# Patient Record
Sex: FEMALE | Race: OTHER | HISPANIC OR LATINO | URBAN - METROPOLITAN AREA
[De-identification: names, ages, dates, MRNs, and addresses within clinical notes are randomized per-mention and may not be internally consistent; named-entity substitution may affect disease eponyms.]

---

## 2019-05-15 ENCOUNTER — INPATIENT (INPATIENT)
Facility: HOSPITAL | Age: 39
LOS: 2 days | Discharge: HOME | DRG: 639 | End: 2019-05-18
Attending: INTERNAL MEDICINE | Admitting: HOSPITALIST
Payer: MEDICAID

## 2019-05-15 VITALS
TEMPERATURE: 98 F | RESPIRATION RATE: 16 BRPM | WEIGHT: 119.93 LBS | DIASTOLIC BLOOD PRESSURE: 85 MMHG | HEART RATE: 83 BPM | OXYGEN SATURATION: 99 % | HEIGHT: 62 IN | SYSTOLIC BLOOD PRESSURE: 144 MMHG

## 2019-05-15 DIAGNOSIS — Z98.89 OTHER SPECIFIED POSTPROCEDURAL STATES: Chronic | ICD-10-CM

## 2019-05-15 LAB
ALBUMIN SERPL ELPH-MCNC: 4.1 G/DL — SIGNIFICANT CHANGE UP (ref 3.3–5.2)
ALP SERPL-CCNC: 99 U/L — SIGNIFICANT CHANGE UP (ref 40–120)
ALT FLD-CCNC: 40 U/L — HIGH
ANION GAP SERPL CALC-SCNC: 14 MMOL/L — SIGNIFICANT CHANGE UP (ref 5–17)
ANISOCYTOSIS BLD QL: SLIGHT — SIGNIFICANT CHANGE UP
AST SERPL-CCNC: 45 U/L — HIGH
BASOPHILS # BLD AUTO: 0 K/UL — SIGNIFICANT CHANGE UP (ref 0–0.2)
BASOPHILS NFR BLD AUTO: 1 % — SIGNIFICANT CHANGE UP (ref 0–2)
BILIRUB SERPL-MCNC: 0.5 MG/DL — SIGNIFICANT CHANGE UP (ref 0.4–2)
BUN SERPL-MCNC: 17 MG/DL — SIGNIFICANT CHANGE UP (ref 8–20)
CALCIUM SERPL-MCNC: 9 MG/DL — SIGNIFICANT CHANGE UP (ref 8.6–10.2)
CHLORIDE SERPL-SCNC: 94 MMOL/L — LOW (ref 98–107)
CO2 SERPL-SCNC: 27 MMOL/L — SIGNIFICANT CHANGE UP (ref 22–29)
CREAT SERPL-MCNC: 0.54 MG/DL — SIGNIFICANT CHANGE UP (ref 0.5–1.3)
ELLIPTOCYTES BLD QL SMEAR: SLIGHT — SIGNIFICANT CHANGE UP
EOSINOPHIL # BLD AUTO: 0 K/UL — SIGNIFICANT CHANGE UP (ref 0–0.5)
GLUCOSE BLDC GLUCOMTR-MCNC: 361 MG/DL — HIGH (ref 70–99)
GLUCOSE BLDC GLUCOMTR-MCNC: 368 MG/DL — HIGH (ref 70–99)
GLUCOSE SERPL-MCNC: 82 MG/DL — SIGNIFICANT CHANGE UP (ref 70–115)
HBA1C BLD-MCNC: 9.1 % — HIGH (ref 4–5.6)
HCT VFR BLD CALC: 33.7 % — LOW (ref 37–47)
HGB BLD-MCNC: 9.3 G/DL — LOW (ref 12–16)
HYPOCHROMIA BLD QL: SLIGHT — SIGNIFICANT CHANGE UP
LYMPHOCYTES # BLD AUTO: 1.7 K/UL — SIGNIFICANT CHANGE UP (ref 1–4.8)
LYMPHOCYTES # BLD AUTO: 33 % — SIGNIFICANT CHANGE UP (ref 20–55)
MACROCYTES BLD QL: SLIGHT — SIGNIFICANT CHANGE UP
MCHC RBC-ENTMCNC: 19.1 PG — LOW (ref 27–31)
MCHC RBC-ENTMCNC: 27.6 G/DL — LOW (ref 32–36)
MCV RBC AUTO: 69.3 FL — LOW (ref 81–99)
MICROCYTES BLD QL: SLIGHT — SIGNIFICANT CHANGE UP
MONOCYTES # BLD AUTO: 0.7 K/UL — SIGNIFICANT CHANGE UP (ref 0–0.8)
MONOCYTES NFR BLD AUTO: 11 % — HIGH (ref 3–10)
NEUTROPHILS # BLD AUTO: 2.7 K/UL — SIGNIFICANT CHANGE UP (ref 1.8–8)
NEUTROPHILS NFR BLD AUTO: 54 % — SIGNIFICANT CHANGE UP (ref 37–73)
OVALOCYTES BLD QL SMEAR: SLIGHT — SIGNIFICANT CHANGE UP
PLAT MORPH BLD: NORMAL — SIGNIFICANT CHANGE UP
PLATELET # BLD AUTO: 366 K/UL — SIGNIFICANT CHANGE UP (ref 150–400)
POIKILOCYTOSIS BLD QL AUTO: SLIGHT — SIGNIFICANT CHANGE UP
POTASSIUM SERPL-MCNC: 4.1 MMOL/L — SIGNIFICANT CHANGE UP (ref 3.5–5.3)
POTASSIUM SERPL-SCNC: 4.1 MMOL/L — SIGNIFICANT CHANGE UP (ref 3.5–5.3)
PROT SERPL-MCNC: 6.7 G/DL — SIGNIFICANT CHANGE UP (ref 6.6–8.7)
RBC # BLD: 4.86 M/UL — SIGNIFICANT CHANGE UP (ref 4.4–5.2)
RBC # FLD: 18.1 % — HIGH (ref 11–15.6)
RBC BLD AUTO: ABNORMAL
SODIUM SERPL-SCNC: 135 MMOL/L — SIGNIFICANT CHANGE UP (ref 135–145)
VARIANT LYMPHS # BLD: 1 % — SIGNIFICANT CHANGE UP (ref 0–6)
WBC # BLD: 5.3 K/UL — SIGNIFICANT CHANGE UP (ref 4.8–10.8)
WBC # FLD AUTO: 5.3 K/UL — SIGNIFICANT CHANGE UP (ref 4.8–10.8)

## 2019-05-15 PROCEDURE — 99218: CPT

## 2019-05-15 RX ORDER — DEXTROSE 50 % IN WATER 50 %
25 SYRINGE (ML) INTRAVENOUS ONCE
Refills: 0 | Status: DISCONTINUED | OUTPATIENT
Start: 2019-05-15 | End: 2019-05-18

## 2019-05-15 RX ORDER — DEXTROSE 50 % IN WATER 50 %
12.5 SYRINGE (ML) INTRAVENOUS ONCE
Refills: 0 | Status: DISCONTINUED | OUTPATIENT
Start: 2019-05-15 | End: 2019-05-18

## 2019-05-15 RX ORDER — SUCRALFATE 1 G
10 TABLET ORAL
Qty: 300 | Refills: 0
Start: 2019-05-15 | End: 2019-05-24

## 2019-05-15 RX ORDER — DEXTROSE 50 % IN WATER 50 %
15 SYRINGE (ML) INTRAVENOUS ONCE
Refills: 0 | Status: COMPLETED | OUTPATIENT
Start: 2019-05-15 | End: 2019-05-15

## 2019-05-15 RX ORDER — INSULIN LISPRO 100/ML
VIAL (ML) SUBCUTANEOUS
Refills: 0 | Status: DISCONTINUED | OUTPATIENT
Start: 2019-05-15 | End: 2019-05-18

## 2019-05-15 RX ORDER — SODIUM CHLORIDE 9 MG/ML
1000 INJECTION, SOLUTION INTRAVENOUS
Refills: 0 | Status: DISCONTINUED | OUTPATIENT
Start: 2019-05-15 | End: 2019-05-18

## 2019-05-15 RX ORDER — SODIUM CHLORIDE 9 MG/ML
1000 INJECTION INTRAMUSCULAR; INTRAVENOUS; SUBCUTANEOUS ONCE
Refills: 0 | Status: COMPLETED | OUTPATIENT
Start: 2019-05-15 | End: 2019-05-15

## 2019-05-15 RX ORDER — INSULIN GLARGINE 100 [IU]/ML
16 INJECTION, SOLUTION SUBCUTANEOUS ONCE
Refills: 0 | Status: COMPLETED | OUTPATIENT
Start: 2019-05-15 | End: 2019-05-15

## 2019-05-15 RX ORDER — ACETAMINOPHEN 500 MG
650 TABLET ORAL EVERY 6 HOURS
Refills: 0 | Status: DISCONTINUED | OUTPATIENT
Start: 2019-05-15 | End: 2019-05-18

## 2019-05-15 RX ORDER — DEXTROSE 50 % IN WATER 50 %
15 SYRINGE (ML) INTRAVENOUS ONCE
Refills: 0 | Status: DISCONTINUED | OUTPATIENT
Start: 2019-05-15 | End: 2019-05-18

## 2019-05-15 RX ORDER — GLUCAGON INJECTION, SOLUTION 0.5 MG/.1ML
1 INJECTION, SOLUTION SUBCUTANEOUS ONCE
Refills: 0 | Status: DISCONTINUED | OUTPATIENT
Start: 2019-05-15 | End: 2019-05-18

## 2019-05-15 RX ORDER — INSULIN LISPRO 100/ML
VIAL (ML) SUBCUTANEOUS AT BEDTIME
Refills: 0 | Status: DISCONTINUED | OUTPATIENT
Start: 2019-05-15 | End: 2019-05-18

## 2019-05-15 RX ADMIN — Medication 650 MILLIGRAM(S): at 15:58

## 2019-05-15 RX ADMIN — Medication 650 MILLIGRAM(S): at 15:05

## 2019-05-15 RX ADMIN — Medication 650 MILLIGRAM(S): at 21:37

## 2019-05-15 RX ADMIN — SODIUM CHLORIDE 1000 MILLILITER(S): 9 INJECTION INTRAMUSCULAR; INTRAVENOUS; SUBCUTANEOUS at 22:23

## 2019-05-15 RX ADMIN — Medication 650 MILLIGRAM(S): at 20:40

## 2019-05-15 RX ADMIN — SODIUM CHLORIDE 1000 MILLILITER(S): 9 INJECTION INTRAMUSCULAR; INTRAVENOUS; SUBCUTANEOUS at 21:37

## 2019-05-15 RX ADMIN — INSULIN GLARGINE 16 UNIT(S): 100 INJECTION, SOLUTION SUBCUTANEOUS at 22:18

## 2019-05-15 RX ADMIN — Medication 15 GRAM(S): at 15:17

## 2019-05-15 RX ADMIN — Medication 3: at 21:37

## 2019-05-15 NOTE — ED STATDOCS - SECONDARY DIAGNOSIS.
Poorly controlled diabetes mellitus Gastroesophageal reflux disease, esophagitis presence not specified

## 2019-05-15 NOTE — ED STATDOCS - PROGRESS NOTE DETAILS
Per [facility], patient last filled Famotidine 40mg in December 2018.  Patient's history of taking medicines consistently is not consistent with Rx. Finger Stick shows blood sugar of 31.  reports that most recent prescription were filled at [facility] in [place].  Called to confirm medications: filled rx for famotidine 40mg daily on 4/26.  Also on atorvastatin 10qd, metformin 500 BID,. basiglar 40unit daily and solistar 50unit BID.

## 2019-05-15 NOTE — ED ADULT NURSE NOTE - OBJECTIVE STATEMENT
Assumed pt care at 1402.  Pt here for nausea and burning stomach. Pt report last took insulin yesterday at 1530.  Her finger stick was 45 this morning, she ate before coming to ER.  Finger stick 31 upon arrival  Pt given juice and sandwich.

## 2019-05-15 NOTE — ED CDU PROVIDER INITIAL DAY NOTE - MEDICAL DECISION MAKING DETAILS
Pt in CDU for hypoglycemia, management of DMI. Seen by Dr. Kylie Rosas. Pt to be observed overnight and see Diabetic specialist in AM.

## 2019-05-15 NOTE — ED STATDOCS - OBJECTIVE STATEMENT
39 y/o F pt with hx of DM I ( controlled with solastar 50 units BID) presents to ED c/o acid reflux x 2 months. Reports that she has had a bitter taste in her mouth for the last 2 months.  Presented to [facility] 2 months with same complaint; was discharged without medications. Reports that she has been prescribed a medication by her PMD for reflux with no relief; does not remember the name of medication. No diarrhea or vomiting. No ABD pain. Is a non smoker. No further complaints at this time.   PMD: Carmelita  : Tomas. 39 y/o F pt with hx of DM I ( controlled with solastar 50 units BID) presents to ED c/o "I have a lot of acid".  Reports spitting a lot and bitter taste in mouth for months.  Presented to [facility] 2 months ago with same complaint; was discharged without medications. Reports that she has been prescribed a medication by her PMD for reflux with no relief; does not remember the name of medication. No diarrhea or vomiting. No ABD pain. Is a non smoker. No further complaints at this time.   PMD: Carmelita  : Tomas.

## 2019-05-15 NOTE — ED CDU PROVIDER INITIAL DAY NOTE - OBJECTIVE STATEMENT
39 y/o F pt with hx of DM I (controlled with solastar 50 units BID) presents to ED c/o acid reflux x 2 months. Reports that she has had a bitter taste in her mouth for the last 2 months.  Presented to [facility] 2 months with same complaint; was discharged without medications. Reports that she has been prescribed a medication by her PMD for reflux with no relief; does not remember the name of medication. No diarrhea or vomiting. No ABD pain. Is a non smoker. No further complaints at this time. Pt on labs found to be hypoglycemic 31 BG.   PMD: Carmelita  : Chip

## 2019-05-15 NOTE — CONSULT NOTE ADULT - ASSESSMENT
38F North Korean speaking w/ T1DM (dx 2010) p/w worsening acid reflux, found to have hypoglycemia.    Hypoglycemia- severe and likely long standing  -due to taking too much insulin overall  -might have been increasing insulin due to not rotating injection sites  -will need CDE to reteach rotation of injection sites  -explained at length to the patient severe risks of hypoglycemia including seizures and death    -labs still pending    T1DM  -on too much insulin  -will need follow up as an outpatient with Dr. Stern as patient lives close to [place]   -patient to check FS 4x daily  -MUST get basal insulin otherwise DKA can result  -added lantus 16 units (weight based dosing)  -continue low dose sliding scale for now  -eventually will need premeal insulin but will hold off until hypoglycemia resolves

## 2019-05-15 NOTE — ED CDU PROVIDER INITIAL DAY NOTE - ATTENDING CONTRIBUTION TO CARE
presented for evalaution of acid reflux like symptoms (bitter taste in mouth), found to have low blood sugar.  On large doses of insulin than would be expected for body habitus/ wieght.  Placed in observation for trending fingerstick and endocrinology consultation

## 2019-05-15 NOTE — ED STATDOCS - CARE PLAN
Principal Discharge DX:	Hypoglycemia  Secondary Diagnosis:	Poorly controlled diabetes mellitus  Secondary Diagnosis:	Gastroesophageal reflux disease, esophagitis presence not specified

## 2019-05-16 DIAGNOSIS — R73.09 OTHER ABNORMAL GLUCOSE: ICD-10-CM

## 2019-05-16 LAB
GLUCOSE BLDC GLUCOMTR-MCNC: 199 MG/DL — HIGH (ref 70–99)
GLUCOSE BLDC GLUCOMTR-MCNC: 206 MG/DL — HIGH (ref 70–99)
GLUCOSE BLDC GLUCOMTR-MCNC: 291 MG/DL — HIGH (ref 70–99)
GLUCOSE BLDC GLUCOMTR-MCNC: 428 MG/DL — HIGH (ref 70–99)
GLUCOSE BLDC GLUCOMTR-MCNC: 434 MG/DL — HIGH (ref 70–99)
GLUCOSE BLDC GLUCOMTR-MCNC: 47 MG/DL — LOW (ref 70–99)
GLUCOSE BLDC GLUCOMTR-MCNC: 49 MG/DL — LOW (ref 70–99)
GLUCOSE BLDC GLUCOMTR-MCNC: 76 MG/DL — SIGNIFICANT CHANGE UP (ref 70–99)

## 2019-05-16 PROCEDURE — 99223 1ST HOSP IP/OBS HIGH 75: CPT

## 2019-05-16 PROCEDURE — 99217: CPT

## 2019-05-16 PROCEDURE — 99232 SBSQ HOSP IP/OBS MODERATE 35: CPT

## 2019-05-16 RX ORDER — HUMAN INSULIN 100 [IU]/ML
14 INJECTION, SUSPENSION SUBCUTANEOUS
Refills: 0 | Status: DISCONTINUED | OUTPATIENT
Start: 2019-05-16 | End: 2019-05-16

## 2019-05-16 RX ORDER — FAMOTIDINE 10 MG/ML
20 INJECTION INTRAVENOUS
Refills: 0 | Status: DISCONTINUED | OUTPATIENT
Start: 2019-05-16 | End: 2019-05-17

## 2019-05-16 RX ORDER — SODIUM CHLORIDE 9 MG/ML
1000 INJECTION INTRAMUSCULAR; INTRAVENOUS; SUBCUTANEOUS
Refills: 0 | Status: DISCONTINUED | OUTPATIENT
Start: 2019-05-16 | End: 2019-05-18

## 2019-05-16 RX ORDER — INSULIN LISPRO 100/ML
5 VIAL (ML) SUBCUTANEOUS
Refills: 0 | Status: DISCONTINUED | OUTPATIENT
Start: 2019-05-16 | End: 2019-05-17

## 2019-05-16 RX ORDER — HUMAN INSULIN 100 [IU]/ML
0.5 INJECTION, SUSPENSION SUBCUTANEOUS
Refills: 0 | Status: DISCONTINUED | OUTPATIENT
Start: 2019-05-16 | End: 2019-05-16

## 2019-05-16 RX ORDER — DEXTROSE 50 % IN WATER 50 %
15 SYRINGE (ML) INTRAVENOUS ONCE
Refills: 0 | Status: COMPLETED | OUTPATIENT
Start: 2019-05-16 | End: 2019-05-16

## 2019-05-16 RX ORDER — INSULIN GLARGINE 100 [IU]/ML
15 INJECTION, SOLUTION SUBCUTANEOUS AT BEDTIME
Refills: 0 | Status: DISCONTINUED | OUTPATIENT
Start: 2019-05-16 | End: 2019-05-17

## 2019-05-16 RX ORDER — HUMAN INSULIN 100 [IU]/ML
14 INJECTION, SUSPENSION SUBCUTANEOUS AT BEDTIME
Refills: 0 | Status: DISCONTINUED | OUTPATIENT
Start: 2019-05-16 | End: 2019-05-16

## 2019-05-16 RX ORDER — SODIUM CHLORIDE 9 MG/ML
1000 INJECTION INTRAMUSCULAR; INTRAVENOUS; SUBCUTANEOUS ONCE
Refills: 0 | Status: COMPLETED | OUTPATIENT
Start: 2019-05-16 | End: 2019-05-16

## 2019-05-16 RX ORDER — HUMAN INSULIN 100 [IU]/ML
0.5 INJECTION, SUSPENSION SUBCUTANEOUS AT BEDTIME
Refills: 0 | Status: DISCONTINUED | OUTPATIENT
Start: 2019-05-16 | End: 2019-05-16

## 2019-05-16 RX ADMIN — FAMOTIDINE 20 MILLIGRAM(S): 10 INJECTION INTRAVENOUS at 17:58

## 2019-05-16 RX ADMIN — SODIUM CHLORIDE 120 MILLILITER(S): 9 INJECTION INTRAMUSCULAR; INTRAVENOUS; SUBCUTANEOUS at 18:46

## 2019-05-16 RX ADMIN — Medication 650 MILLIGRAM(S): at 10:17

## 2019-05-16 RX ADMIN — Medication 5 UNIT(S): at 18:26

## 2019-05-16 RX ADMIN — Medication 6: at 18:26

## 2019-05-16 RX ADMIN — SODIUM CHLORIDE 1000 MILLILITER(S): 9 INJECTION INTRAMUSCULAR; INTRAVENOUS; SUBCUTANEOUS at 02:26

## 2019-05-16 RX ADMIN — INSULIN GLARGINE 15 UNIT(S): 100 INJECTION, SOLUTION SUBCUTANEOUS at 21:09

## 2019-05-16 RX ADMIN — Medication 15 GRAM(S): at 08:21

## 2019-05-16 RX ADMIN — Medication 650 MILLIGRAM(S): at 19:00

## 2019-05-16 RX ADMIN — Medication 3: at 11:46

## 2019-05-16 RX ADMIN — Medication 650 MILLIGRAM(S): at 17:58

## 2019-05-16 RX ADMIN — Medication 650 MILLIGRAM(S): at 11:40

## 2019-05-16 NOTE — ED ADULT NURSE REASSESSMENT NOTE - COMFORT CARE
warm blanket provided/darkened lights/po fluids offered/plan of care explained
ambulated to bathroom
wait time explained/plan of care explained

## 2019-05-16 NOTE — ED CDU PROVIDER SUBSEQUENT DAY NOTE - PROGRESS NOTE DETAILS
Pt received from MOY Ramos, upon morning finger stick pt found to be hypoglycemia by RN, hypoglycemia protocol initiated. D/w Dr. Ribera, will admit to medicine for labile blood glucose levels and further management.

## 2019-05-16 NOTE — ED CDU PROVIDER SUBSEQUENT DAY NOTE - ATTENDING CONTRIBUTION TO CARE
seen by endocrinology yesterday.  however, outpatient treatment plan not specified.  Widely fluctuating blood sugars noted.  awaiting repeat consultation by endocrine today with nutrition/ dietetic consult.

## 2019-05-16 NOTE — H&P ADULT - ASSESSMENT
38F Greenlandic speaking w/ T1DM (dx 2010) p/w worsening acid reflux, found to have hypoglycemia.    IDDM_ Hypoglycemia- / with hyperglycemia- discussed with Dr Trevino is here to see patient will - start patient on insulin regimen   -explained at length to the patient severe risks of hypoglycemia including seizures and death  -continue low dose sliding scale for now  -eventually will need pre meal insulin but will hold off until hypoglycemia resolves

## 2019-05-16 NOTE — ED CDU PROVIDER DISPOSITION NOTE - CLINICAL COURSE
37 y/o F with DM type I, presented with epigastric pain, found to have hypoglycemia, now with labile blood glucose levels, will admit to medicine for further management.

## 2019-05-16 NOTE — CHART NOTE - NSCHARTNOTEFT_GEN_A_CORE
Pt provided with verbal and written education on healthy eating using the my plate guidelines. Information provided in Azeri. Pt appears satisfied with interaction. RD will remain available.    Kamala Kenyon, Dietetic Intern

## 2019-05-16 NOTE — ED ADULT NURSE REASSESSMENT NOTE - GENERAL PATIENT STATE
comfortable appearance/cooperative
comfortable appearance
cooperative/resting/sleeping/comfortable appearance/smiling/interactive

## 2019-05-16 NOTE — ED CLERICAL - CLERICAL COMMENTS
Endochronology called for consult Endochronology called for consult . [facility] called for diabetic consult

## 2019-05-16 NOTE — PROGRESS NOTE ADULT - ASSESSMENT
38 year old female with type 1 DM admitted with symptoms of GERD and found to have severe Hypoglycemia, likely from use of very large doses of rapid acting insulin (admelog).  I suspect she may have irregular insulin absorption from injection into areas of lipohypertrophy.  For a Type 1 diabetic, thes insulin doses are excessively large.     1.  Hypoglycemia-  resolving.  Likely from excessive insulin dosing.  Will check AM cortisol to rule out rare chance of adrenal insufficiency as well as fasting C-peptide just to confirm if patient is actually type 1 (would expect low or undetectable C-peptide).  2.  Type 1DM-  spot to patient about proper site rotation of insulin injections to avoid irregular absorption.  She should try using abdomen and legs as new injection sites.  Will reduce Lantus to 15 units qhs and add 5 units premeal humalog as she is now hyperglycemic after eating.  Will further titrate regimen based on fingersticks.    3.  GERD-  continue famotidine.

## 2019-05-16 NOTE — H&P ADULT - NSHPPHYSICALEXAM_GEN_ALL_CORE
GENERAL: NAD, well-groomed, well-developed  HEAD:  Atraumatic, Normocephalic  ENMT: No tonsillar erythema, exudates, or enlargement; Moist mucous membranes,   NERVOUS SYSTEM:  Alert & Oriented X3, Good concentration; Motor Strength 5/5 B/L upper and lower extremities;   CHEST/LUNG: Clear to percussion bilaterally; No rales, rhonchi, wheezing, or rubs  HEART: Regular rate and rhythm; No murmurs, rubs, or gallops  ABDOMEN: Soft, Nontender, Nondistended; Bowel sounds present  EXTREMITIES:  2+ Peripheral Pulses, No clubbing, cyanosis, or edema

## 2019-05-16 NOTE — ED CDU PROVIDER DISPOSITION NOTE - ATTENDING CONTRIBUTION TO CARE
admitted to observation for therapeutic intensity/ management of diabetes.  had widely fluctuating blood sugars overnight.  case d/w endocrinology this morning.  will admit to hospitalist service for continued blood glucose tracking and treatement.

## 2019-05-16 NOTE — ED ADULT NURSE REASSESSMENT NOTE - NS ED NURSE REASSESS COMMENT FT1
Care endorsed to [employer] SUKUMAR Kahn. Patient awaiting DM consult in am.
Pt alert and oriented, no apparent distress noted at this time. Pt handed off to milagro PATINO in stable condition.
after tolerating two cups of juice and a turkey sandwich over an hour ago, repeat blood glucose 64. Pt remains comfortable, alert and oriented X 3 with no obvious signs of hypoglycemia.  Given oral glucose as per MD order.
assumed care of pt @ 1930, report received from consuelo RN, charting as noted. Pt AOx4 in NAD, Vital Signs Stable. pt awaiting DM education. HR is NSR on cardiac monitor, lung sounds are clear b/l, abd is soft and nontender with positive bowel sounds in all four quadrants, skin is warm, dry and appropriate for age and race. pt educated on plan of care and observation stay. Plan of care taught back to RN. Proficiency determined from successful pt teach back. Pt oriented to unit, staff, and room. Pt reeducated on call bell use. Bed locked in lowest position, call bell within reach. All questions and concerns addressed.
IVF bolus administered. Bedtime fs 368, 3U Humalog administered. Per cierra WINTER for pt to receive PM Lantus. All questions and concerns addressed.
Assumed care of the patient at 1530. Patient moved to CDU 8, observation unit. Patient A&Ox3. Macedonian speaking only  services provided. No s/s of distress. Patient states mild epigastric pain persists since this am however now it improved. Patient DM [id number], recently changes in her insulin regimen. Patient takes 50Units of "Admelog" in afternoon since 1 month. States Checks BG three times a day. States readings varies, in past few days frequent low sugars 45-50. Yesterday before going to sleep FS 92, patient didn't take her insulin, this morning FS was 45. Patient states had breakfast but epigastric pain persisted and patient decided to come to ED. When asked patient states have been holding insulin for BG<100. Patient provided with Meal tray. FS q4hrs. Patient demonstrated correct use of glucometer with supervision of RN. Ambulatory. Patient awaiting DM consult. Patient in understanding of plan of care. Will continue to monitor.
Patient aware of bed assignment, vital signs stable, pt in stable condition. No obvious distress noted. Report given to 3 Roxanne RN. Pt awaiting transport.

## 2019-05-16 NOTE — PROGRESS NOTE ADULT - SUBJECTIVE AND OBJECTIVE BOX
Patient seen for follow up of hypoglycemia.    Patient interviewed with use of IPAD .    Interval HPI/ Overnight Events:  Patient confirmed that she has been taking short acting insulin Admelog 50 units with breakfast and lunch only (does not take any with dinner) and basaglar insulin 40 units qhs.    On arrival to ED, her BG was in the 30s, which was presumably induced by her large bolus of Admelog 50 units.  Patient confirms that she has only been injecting these doses of insulin into her arms.      Last night she received a dose of Lantus 16 units and then 3 units of humalog for BG of > 300, and her fasting BG was low this AM.      She currently complains of headache, but denies nausea and has been eating well.      MEDICATIONS  (STANDING):  dextrose 5%. 1000 milliLiter(s) (50 mL/Hr) IV Continuous <Continuous>  dextrose 50% Injectable 12.5 Gram(s) IV Push once  dextrose 50% Injectable 25 Gram(s) IV Push once  dextrose 50% Injectable 25 Gram(s) IV Push once  famotidine    Tablet 20 milliGRAM(s) Oral two times a day  insulin lispro (HumaLOG) corrective regimen sliding scale   SubCutaneous three times a day before meals  insulin lispro (HumaLOG) corrective regimen sliding scale   SubCutaneous at bedtime  insulin NPH human recombinant 14 Unit(s) SubCutaneous before breakfast  insulin NPH human recombinant 14 Unit(s) SubCutaneous at bedtime    Vital Signs Last 24 Hrs  T(C): 36.6 (16 May 2019 12:30), Max: 36.8 (15 May 2019 16:00)  T(F): 97.9 (16 May 2019 12:30), Max: 98.2 (15 May 2019 16:00)  HR: 95 (16 May 2019 12:30) (77 - 95)  BP: 105/69 (16 May 2019 12:30) (92/63 - 113/77)  RR: 16 (16 May 2019 12:30) (15 - 18)  SpO2: 100% (16 May 2019 12:30) (96% - 100%)    PE:  Gen: AAO, NAD  HEENT:  sclera anicteric, MMM  Neck:  no thyromegaly, no LAD  CV:  nl S1 + S2, RRR, no m/r/g  Resp:  nl respiratory effort, CTA b/l  GI/ Abd: soft, NT/ ND, BS +  MS:  no c/c/e  Psych:  affect appropriate    LABS:  Hemoglobin A1C, Whole Blood: 9.1 % (05.15.19 @ 14:21)    05-15    135  |  94<L>  |  17.0  ----------------------------<  82  4.1   |  27.0  |  0.54    Ca    9.0      15 May 2019 15:25    TPro  6.7  /  Alb  4.1  /  TBili  0.5  /  DBili  x   /  AST  45<H>  /  ALT  40<H>  /  AlkPhos  99  05-15                          9.3    5.3   )-----------( 366      ( 15 May 2019 14:21 )             33.7     CAPILLARY BLOOD GLUCOSE  POCT Blood Glucose.: 291 mg/dL (16 May 2019 11:28)  POCT Blood Glucose.: 199 mg/dL (16 May 2019 09:55)  POCT Blood Glucose.: 76 mg/dL (16 May 2019 08:41)  POCT Blood Glucose.: 49 mg/dL (16 May 2019 08:12)  POCT Blood Glucose.: 47 mg/dL (16 May 2019 08:11)  POCT Blood Glucose.: 368 mg/dL (15 May 2019 21:33)  POCT Blood Glucose.: 361 mg/dL (15 May 2019 21:31)  POCT Blood Glucose.: 78 mg/dL (15 May 2019 16:04)

## 2019-05-16 NOTE — ED ADULT NURSE REASSESSMENT NOTE - NSIMPLEMENTINTERV_GEN_ALL_ED
Implemented All Universal Safety Interventions:  [place] to call system. Call bell, personal items and telephone within reach. Instruct patient to call for assistance. Room bathroom lighting operational. Non-slip footwear when patient is off stretcher. Physically safe environment: no spills, clutter or unnecessary equipment. Stretcher in lowest position, wheels locked, appropriate side rails in place.

## 2019-05-16 NOTE — H&P ADULT - HISTORY OF PRESENT ILLNESS
presents with hypoglycemia was on brie star 50 BID out patient -has been in observation unit and awaiting stabilizing of the blood glucose endocrine consult 38  yr old female presents with hypoglycemia was on brie star 50 BID out patient -has been in observation unit with blood glucose ranging from  and awaiting stabilizing of the blood glucose endocrine consult  yesterday came in with  worsening acid reflux, found to have hypoglycemia. patient has been checking FS 3x daily but reports hypoglycemia to 30s. ranges from 30s-200s.  AM FS was 45 yesterday had breakfast yesterday morning with sandwich and oatmeal and took 50 units of admelog, takes basaglar 40 units QHS and admelog 50 units before each meal, injects insulin in R arm, does not use abdomen for injections,  ov

## 2019-05-16 NOTE — ED ADULT NURSE REASSESSMENT NOTE - STATUS
awaiting consult
awaiting consult/DM Consult/Education
awaiting transport to [address] [place] [address]

## 2019-05-17 LAB
CORTIS AM PEAK SERPL-MCNC: 14.5 UG/DL — SIGNIFICANT CHANGE UP (ref 6–18.4)
GLUCOSE BLDC GLUCOMTR-MCNC: 234 MG/DL — HIGH (ref 70–99)
GLUCOSE BLDC GLUCOMTR-MCNC: 284 MG/DL — HIGH (ref 70–99)
GLUCOSE BLDC GLUCOMTR-MCNC: 341 MG/DL — HIGH (ref 70–99)
GLUCOSE BLDC GLUCOMTR-MCNC: 74 MG/DL — SIGNIFICANT CHANGE UP (ref 70–99)

## 2019-05-17 PROCEDURE — 99232 SBSQ HOSP IP/OBS MODERATE 35: CPT

## 2019-05-17 PROCEDURE — 99233 SBSQ HOSP IP/OBS HIGH 50: CPT

## 2019-05-17 PROCEDURE — 99223 1ST HOSP IP/OBS HIGH 75: CPT

## 2019-05-17 RX ORDER — SUCRALFATE 1 G
1 TABLET ORAL
Refills: 0 | Status: DISCONTINUED | OUTPATIENT
Start: 2019-05-17 | End: 2019-05-18

## 2019-05-17 RX ORDER — PANTOPRAZOLE SODIUM 20 MG/1
40 TABLET, DELAYED RELEASE ORAL
Refills: 0 | Status: DISCONTINUED | OUTPATIENT
Start: 2019-05-17 | End: 2019-05-18

## 2019-05-17 RX ORDER — INSULIN GLARGINE 100 [IU]/ML
11 INJECTION, SOLUTION SUBCUTANEOUS AT BEDTIME
Refills: 0 | Status: DISCONTINUED | OUTPATIENT
Start: 2019-05-17 | End: 2019-05-18

## 2019-05-17 RX ADMIN — INSULIN GLARGINE 11 UNIT(S): 100 INJECTION, SOLUTION SUBCUTANEOUS at 21:58

## 2019-05-17 RX ADMIN — Medication 650 MILLIGRAM(S): at 10:48

## 2019-05-17 RX ADMIN — Medication 1 GRAM(S): at 18:12

## 2019-05-17 RX ADMIN — Medication 4: at 18:06

## 2019-05-17 RX ADMIN — Medication 650 MILLIGRAM(S): at 10:51

## 2019-05-17 RX ADMIN — Medication 2: at 12:10

## 2019-05-17 RX ADMIN — Medication 1 GRAM(S): at 23:49

## 2019-05-17 RX ADMIN — SODIUM CHLORIDE 120 MILLILITER(S): 9 INJECTION INTRAMUSCULAR; INTRAVENOUS; SUBCUTANEOUS at 06:02

## 2019-05-17 RX ADMIN — SODIUM CHLORIDE 120 MILLILITER(S): 9 INJECTION INTRAMUSCULAR; INTRAVENOUS; SUBCUTANEOUS at 17:18

## 2019-05-17 RX ADMIN — SODIUM CHLORIDE 120 MILLILITER(S): 9 INJECTION INTRAMUSCULAR; INTRAVENOUS; SUBCUTANEOUS at 10:50

## 2019-05-17 RX ADMIN — Medication 1: at 21:58

## 2019-05-17 RX ADMIN — FAMOTIDINE 20 MILLIGRAM(S): 10 INJECTION INTRAVENOUS at 06:02

## 2019-05-17 NOTE — PROGRESS NOTE ADULT - ATTENDING COMMENTS
patient examined chart reviewed , patient needs to be trained better in diabetic control BS this morning 70

## 2019-05-17 NOTE — PROGRESS NOTE ADULT - SUBJECTIVE AND OBJECTIVE BOX
INTERVAL HPI/OVERNIGHT EVENTS:  follow up for dm2 management.     MEDICATIONS  (STANDING):  dextrose 5%. 1000 milliLiter(s) (50 mL/Hr) IV Continuous <Continuous>  dextrose 50% Injectable 12.5 Gram(s) IV Push once  dextrose 50% Injectable 25 Gram(s) IV Push once  dextrose 50% Injectable 25 Gram(s) IV Push once  famotidine    Tablet 20 milliGRAM(s) Oral two times a day  insulin glargine Injectable (LANTUS) 11 Unit(s) SubCutaneous at bedtime  insulin lispro (HumaLOG) corrective regimen sliding scale   SubCutaneous three times a day before meals  insulin lispro (HumaLOG) corrective regimen sliding scale   SubCutaneous at bedtime  sodium chloride 0.9%. 1000 milliLiter(s) (120 mL/Hr) IV Continuous <Continuous>    MEDICATIONS  (PRN):  acetaminophen   Tablet .. 650 milliGRAM(s) Oral every 6 hours PRN Mild Pain (1 - 3)  dextrose 40% Gel 15 Gram(s) Oral once PRN Blood Glucose LESS THAN 70 milliGRAM(s)/deciliter  glucagon  Injectable 1 milliGRAM(s) IntraMuscular once PRN Glucose LESS THAN 70 milligrams/deciliter    Allergies  No Known Allergies    Review of systems: no CP, no SOB, no N/ V/D     Vital Signs Last 24 Hrs  T(C): 36.6 (17 May 2019 08:02), Max: 36.8 (16 May 2019 19:28)  T(F): 97.9 (17 May 2019 08:02), Max: 98.3 (16 May 2019 19:28)  HR: 100 (17 May 2019 08:02) (90 - 100)  BP: 107/70 (17 May 2019 08:02) (106/68 - 116/78)  BP(mean): --  RR: 18 (17 May 2019 08:02) (15 - 18)  SpO2: 99% (17 May 2019 08:02) (99% - 100%)    PHYSICAL EXAM:  Constitutional: NAD  HEENT: PERRLA, EOM  Neck: No LAD, No JVD,  no thyroid enlargement  Respiratory: CTAB  Cardiovascular: S1 and S2, RRR, no M/G/R  Gastrointestinal: BS+, soft, no organomeglag or mass  Extremities: No peripheral edema, no pedal lesions  Vascular: 2+ peripheral pulses  Neurological: A/O x 3, no focal deficits  Psychiatric: Normal mood, normal affect  Skin: No rashes    LABS:  05-15    135  |  94<L>  |  17.0  ----------------------------<  82  4.1   |  27.0  |  0.54    Ca    9.0      15 May 2019 15:25    TPro  6.7  /  Alb  4.1  /  TBili  0.5  /  DBili  x   /  AST  45<H>  /  ALT  40<H>  /  AlkPhos  99  05-15

## 2019-05-17 NOTE — PROGRESS NOTE ADULT - ASSESSMENT
38 year old female with type 1 DM admitted with symptoms of GERD and found to have severe Hypoglycemia, likely from use of very large doses of rapid acting insulin (admelog).  I suspect she may have irregular insulin absorption from injection into areas of lipohypertrophy.  For a Type 1 diabetic, thes insulin doses are excessively large.     1.  Hypoglycemia-  resolving.  Likely from excessive insulin dosing.   decreased lantus to 11 u HS   she needs to take meal insulin . start lispro 3 u TID w meals.   cortisol am could be considered but it was never ordered.   C-peptide pending     2.  Type 1DM-    Will further titrate regimen based on fingersticks.    she needs to follow up as outpatient with endocrinology, preferably and not managed by PCP since she needs a lot of teaching and education    3.  GERD-  continue famotidine.

## 2019-05-17 NOTE — PROGRESS NOTE ADULT - SUBJECTIVE AND OBJECTIVE BOX
CC: labile blood glucose ranging from 31- 368- (17 May 2019 14:41)    HPI:  38  yr old female presents with hypoglycemia was on brie star 50 BID out patient -has been in observation unit with blood glucose ranging from  and awaiting stabilizing of the blood glucose.  Endocrine consulted.      INTERVAL HPI/OVERNIGHT EVENTS: Patient seen and examined lying in bed with  present.  Patient complains of headache, controlled with Tylenol.  Patient denies any  dizziness, SOB, CP, abdominal pain, nausea, vomiting, dysuria.  Other ROS reviewed and are negative.     Vital Signs Last 24 Hrs  T(C): 36.6 (17 May 2019 08:02), Max: 36.8 (16 May 2019 19:28)  T(F): 97.9 (17 May 2019 08:02), Max: 98.3 (16 May 2019 19:28)  HR: 100 (17 May 2019 08:02) (90 - 100)  BP: 107/70 (17 May 2019 08:02) (106/68 - 116/78)  BP(mean): --  RR: 18 (17 May 2019 08:02) (15 - 18)  SpO2: 99% (17 May 2019 08:02) (99% - 100%)  I&O's Detail    16 May 2019 07:01  -  17 May 2019 07:00  --------------------------------------------------------  IN:    sodium chloride 0.9%.: 1440 mL  Total IN: 1440 mL    OUT:  Total OUT: 0 mL    Total NET: 1440 mL    PHYSICAL EXAM:  GENERAL: NAD  HEAD:  Atraumatic, Normocephalic  NECK: Supple, No JVD, Normal thyroid  NERVOUS SYSTEM:  Alert & Oriented X3, Good concentration; Motor Strength 5/5 B/L upper and lower extremities  CHEST/LUNG: Clear to auscultation bilaterally; No rales, rhonchi, wheezing, or rubs  HEART: Regular rate and rhythm; No murmurs, rubs, or gallops  ABDOMEN: Soft, Nontender, Nondistended; Bowel sounds present  EXTREMITIES:  2+ Peripheral Pulses, No clubbing, cyanosis, or edema        CAPILLARY BLOOD GLUCOSE  POCT Blood Glucose.: 234 mg/dL (17 May 2019 12:08)  POCT Blood Glucose.: 74 mg/dL (17 May 2019 08:42)  POCT Blood Glucose.: 206 mg/dL (16 May 2019 21:08)  POCT Blood Glucose.: 434 mg/dL (16 May 2019 18:03)  POCT Blood Glucose.: 428 mg/dL (16 May 2019 18:01)        Hemoglobin A1C, Whole Blood: 9.1 % (05-15-19 @ 14:21)    MEDICATIONS  (STANDING):  dextrose 5%. 1000 milliLiter(s) (50 mL/Hr) IV Continuous <Continuous>  dextrose 50% Injectable 12.5 Gram(s) IV Push once  dextrose 50% Injectable 25 Gram(s) IV Push once  dextrose 50% Injectable 25 Gram(s) IV Push once  famotidine    Tablet 20 milliGRAM(s) Oral two times a day  insulin glargine Injectable (LANTUS) 11 Unit(s) SubCutaneous at bedtime  insulin lispro (HumaLOG) corrective regimen sliding scale   SubCutaneous three times a day before meals  insulin lispro (HumaLOG) corrective regimen sliding scale   SubCutaneous at bedtime  sodium chloride 0.9%. 1000 milliLiter(s) (120 mL/Hr) IV Continuous <Continuous>    MEDICATIONS  (PRN):  acetaminophen   Tablet .. 650 milliGRAM(s) Oral every 6 hours PRN Mild Pain (1 - 3)  dextrose 40% Gel 15 Gram(s) Oral once PRN Blood Glucose LESS THAN 70 milliGRAM(s)/deciliter  glucagon  Injectable 1 milliGRAM(s) IntraMuscular once PRN Glucose LESS THAN 70 milligrams/deciliter CC: labile blood glucose ranging from 31- 368- (17 May 2019 14:41)    HPI:  38  yr old female presents with hypoglycemia was on brie star 50 BID out patient -has been in observation unit with blood glucose ranging from  and awaiting stabilizing of the blood glucose.  Endocrine consulted.      INTERVAL HPI/OVERNIGHT EVENTS: Patient seen and examined lying in bed with  present.  Patient complains of headache, controlled with Tylenol and GERD.  Patient denies any  dizziness, SOB, CP, abdominal pain, nausea, vomiting, dysuria.  Other ROS reviewed and are negative.     Vital Signs Last 24 Hrs  T(C): 36.6 (17 May 2019 08:02), Max: 36.8 (16 May 2019 19:28)  T(F): 97.9 (17 May 2019 08:02), Max: 98.3 (16 May 2019 19:28)  HR: 100 (17 May 2019 08:02) (90 - 100)  BP: 107/70 (17 May 2019 08:02) (106/68 - 116/78)  BP(mean): --  RR: 18 (17 May 2019 08:02) (15 - 18)  SpO2: 99% (17 May 2019 08:02) (99% - 100%)  I&O's Detail    16 May 2019 07:01  -  17 May 2019 07:00  --------------------------------------------------------  IN:    sodium chloride 0.9%.: 1440 mL  Total IN: 1440 mL    OUT:  Total OUT: 0 mL    Total NET: 1440 mL    PHYSICAL EXAM:  GENERAL: NAD  HEAD:  Atraumatic, Normocephalic  NECK: Supple, No JVD, Normal thyroid  NERVOUS SYSTEM:  Alert & Oriented X3, Good concentration; Motor Strength 5/5 B/L upper and lower extremities  CHEST/LUNG: Clear to auscultation bilaterally; No rales, rhonchi, wheezing, or rubs  HEART: Regular rate and rhythm; No murmurs, rubs, or gallops  ABDOMEN: Soft, Nontender, Nondistended; Bowel sounds present  EXTREMITIES:  2+ Peripheral Pulses, No clubbing, cyanosis, or edema        CAPILLARY BLOOD GLUCOSE  POCT Blood Glucose.: 234 mg/dL (17 May 2019 12:08)  POCT Blood Glucose.: 74 mg/dL (17 May 2019 08:42)  POCT Blood Glucose.: 206 mg/dL (16 May 2019 21:08)  POCT Blood Glucose.: 434 mg/dL (16 May 2019 18:03)  POCT Blood Glucose.: 428 mg/dL (16 May 2019 18:01)        Hemoglobin A1C, Whole Blood: 9.1 % (05-15-19 @ 14:21)    MEDICATIONS  (STANDING):  dextrose 5%. 1000 milliLiter(s) (50 mL/Hr) IV Continuous <Continuous>  dextrose 50% Injectable 12.5 Gram(s) IV Push once  dextrose 50% Injectable 25 Gram(s) IV Push once  dextrose 50% Injectable 25 Gram(s) IV Push once  famotidine    Tablet 20 milliGRAM(s) Oral two times a day  insulin glargine Injectable (LANTUS) 11 Unit(s) SubCutaneous at bedtime  insulin lispro (HumaLOG) corrective regimen sliding scale   SubCutaneous three times a day before meals  insulin lispro (HumaLOG) corrective regimen sliding scale   SubCutaneous at bedtime  sodium chloride 0.9%. 1000 milliLiter(s) (120 mL/Hr) IV Continuous <Continuous>    MEDICATIONS  (PRN):  acetaminophen   Tablet .. 650 milliGRAM(s) Oral every 6 hours PRN Mild Pain (1 - 3)  dextrose 40% Gel 15 Gram(s) Oral once PRN Blood Glucose LESS THAN 70 milliGRAM(s)/deciliter  glucagon  Injectable 1 milliGRAM(s) IntraMuscular once PRN Glucose LESS THAN 70 milligrams/deciliter

## 2019-05-17 NOTE — CONSULT NOTE ADULT - SUBJECTIVE AND OBJECTIVE BOX
HISTORY OF PRESENT ILLNESS: This is a 38-year-old woman with a past medical history significant for poorly controlled T1DM (diagnosed ) who presented to the ED with complaints of worsening acid reflux for the past two months and was found to have hypoglycemia.  She complained of a bitter taste in her mouth during this same period.  She presented to [facility] with the same complaint and was discharged without medications.  She reported that her PCP had prescribed an antiacid that provided her with no relief.  She reports perpetual burning in her chest and throat and reports having vomited many times.      ROS: A 14-point review of systems was completed and was otherwise negative save what was reported in the HPI.    PAST MEDICAL/SURGICAL HISTORY:  Diabetes  S/P  section: x3    SOCIAL HISTORY:  - TOBACCO: Denies  - ALCOHOL: Denies  - ILLICIT DRUG USE: Denies    FAMILY HISTORY:  No known history of gastrointestinal or liver disease;      HOME MEDICATIONS:  Admelog SoloStar 100 units/mL injectable solution:  (16 May 2019 09:59)  HumaLOG KwikPen 100 units/mL subcutaneous solution: 30 unit(s) subcutaneous once a day (in the evening) (15 May 2019 14:01)  HumaLOG KwikPen 100 units/mL subcutaneous solution: 50 unit(s) subcutaneous once a day (in the morning) (15 May 2019 14:01)    INPATIENT MEDICATIONS:  MEDICATIONS  (STANDING):  dextrose 5%. 1000 milliLiter(s) (50 mL/Hr) IV Continuous <Continuous>  dextrose 50% Injectable 12.5 Gram(s) IV Push once  dextrose 50% Injectable 25 Gram(s) IV Push once  dextrose 50% Injectable 25 Gram(s) IV Push once  insulin glargine Injectable (LANTUS) 11 Unit(s) SubCutaneous at bedtime  insulin lispro (HumaLOG) corrective regimen sliding scale   SubCutaneous three times a day before meals  insulin lispro (HumaLOG) corrective regimen sliding scale   SubCutaneous at bedtime  pantoprazole    Tablet 40 milliGRAM(s) Oral before breakfast  sodium chloride 0.9%. 1000 milliLiter(s) (120 mL/Hr) IV Continuous <Continuous>    MEDICATIONS  (PRN):  acetaminophen   Tablet .. 650 milliGRAM(s) Oral every 6 hours PRN Mild Pain (1 - 3)  dextrose 40% Gel 15 Gram(s) Oral once PRN Blood Glucose LESS THAN 70 milliGRAM(s)/deciliter  glucagon  Injectable 1 milliGRAM(s) IntraMuscular once PRN Glucose LESS THAN 70 milligrams/deciliter    ALLERGIES:  No Known Allergies    VITAL SIGNS LAST 24 HOURS:  T(C): 36.6 (17 May 2019 08:02), Max: 36.8 (16 May 2019 19:28)  T(F): 97.9 (17 May 2019 08:02), Max: 98.3 (16 May 2019 19:28)  HR: 100 (17 May 2019 08:02) (90 - 100)  BP: 107/70 (17 May 2019 08:02) (106/68 - 116/78)  BP(mean): --  RR: 18 (17 May 2019 08:02) (15 - 18)  SpO2: 99% (17 May 2019 08:02) (99% - 100%)      19 @ 07:01  -  19 @ 07:00  --------------------------------------------------------  IN: 1440 mL / OUT: 0 mL / NET: 1440 mL    19 @ 07:01  -  19 @ 07:00  --------------------------------------------------------  IN: 1440 mL / OUT: 0 mL / NET: 1440 mL      PHYSICAL EXAM:  Constitutional: Well-developed, well-nourished, in no apparent distress  Eyes: Sclerae anicteric, conjunctivae normal  ENMT: Mucus membranes moist, no oropharyngeal thrush noted  Neck: No thyroid nodules appreciated, no significant cervical or supraclavicular lymphadenopathy  Respiratory: Breathing nonlabored; clear to auscultation  Cardiovascular: Regular rate and rhythm  Gastrointestinal: Soft, nontender, nondistended, normoactive bowel sounds; no hepatosplenomegaly appreciated; no rebound tenderness or involuntary guarding  Extremities: No clubbing, cyanosis or edema  Neurological: Alert and oriented to person, place and time; no asterixis  Skin: No jaundice  Lymph Nodes: No significant lymphadenopathy  Musculoskeletal: No significant peripheral atrophy  Psychiatric: Affect and mood appropriate      LABS: No recent labs    IMAGING: None

## 2019-05-17 NOTE — CONSULT NOTE ADULT - ASSESSMENT
Briefly, this is a 38-year-old woman with a past medical history of poorly controlled T1DM who presented to the ED with complaints of worsening acid reflux symptoms over the past two months and was found to be hypoglycemic.  Reportedly, she was started on a medication for her GERD by her PCP that she said was ineffective.  Whether this is GERD or a component of diabetic gastroparesis is not clear.  For now, I would treat her symptomatically with pantoprazole 40 mg once daily in the morning on an empty stomach and add sucralfate 1 gm QID.  As an outpatient, she can be evaluated with an EGD and gastric emptying study.  I do not think either test warrants inpatient workup.    Recommendations:  - Stop H2RA  - Start pantoprazole 40 mg PO daily  - Start sucralfate 1 gm PO QID  - Follow up as outpatient for EGD and GES    Thank you for the consult.  Please do not hesitate to call with any questions or concerns.    DIOR Neely MD  [facility]  Division of Gastroenterology  Tel [phone]  Fax [phone]  05-17-19 @ 17:20

## 2019-05-17 NOTE — PROGRESS NOTE ADULT - ASSESSMENT
38F Persian speaking w/ T1DM (dx 2010) p/w worsening acid reflux, found to have hypoglycemia.    IDDM Hypoglycemia with hyperglycemia-   -Endocrine follow up appreciated - started on pre-meals.  Lantus decreased.    -Awaiting C peptide.  -continue low dose sliding scale for now    GERD  -Continue Pepcid.      Disposition   -Likely discharge 5/18/19 if FS controlled. 38F Upper sorbian speaking w/ T1DM (dx 2010) p/w worsening acid reflux, found to have hypoglycemia.    IDDM Hypoglycemia with hyperglycemia-   -Endocrine follow up appreciated - started on pre-meals.  Lantus decreased.    -Awaiting C peptide.  -continue low dose sliding scale for now.  -Diabetic education.    GERD  -Continue Pepcid.    -GI consulted.    Disposition   -Likely discharge 5/18/19 if FS controlled.

## 2019-05-18 ENCOUNTER — TRANSCRIPTION ENCOUNTER (OUTPATIENT)
Age: 39
End: 2019-05-18

## 2019-05-18 VITALS
HEART RATE: 86 BPM | SYSTOLIC BLOOD PRESSURE: 109 MMHG | DIASTOLIC BLOOD PRESSURE: 72 MMHG | RESPIRATION RATE: 19 BRPM | OXYGEN SATURATION: 100 % | TEMPERATURE: 97 F

## 2019-05-18 LAB
C PEPTIDE SERPL-MCNC: <0.1 NG/ML — LOW (ref 1.1–4.4)
GLUCOSE BLDC GLUCOMTR-MCNC: 144 MG/DL — HIGH (ref 70–99)
GLUCOSE BLDC GLUCOMTR-MCNC: 394 MG/DL — HIGH (ref 70–99)

## 2019-05-18 PROCEDURE — 99239 HOSP IP/OBS DSCHRG MGMT >30: CPT

## 2019-05-18 PROCEDURE — 99233 SBSQ HOSP IP/OBS HIGH 50: CPT

## 2019-05-18 RX ORDER — ENOXAPARIN SODIUM 100 MG/ML
11 INJECTION SUBCUTANEOUS
Qty: 2 | Refills: 0
Start: 2019-05-18 | End: 2019-06-16

## 2019-05-18 RX ORDER — INSULIN LISPRO 100/ML
3 VIAL (ML) SUBCUTANEOUS
Refills: 0 | Status: DISCONTINUED | OUTPATIENT
Start: 2019-05-18 | End: 2019-05-18

## 2019-05-18 RX ORDER — INSULIN LISPRO 100/ML
0 VIAL (ML) SUBCUTANEOUS
Qty: 0 | Refills: 0 | DISCHARGE

## 2019-05-18 RX ORDER — PANTOPRAZOLE SODIUM 20 MG/1
1 TABLET, DELAYED RELEASE ORAL
Qty: 30 | Refills: 0
Start: 2019-05-18 | End: 2019-06-16

## 2019-05-18 RX ORDER — SUCRALFATE 1 G
1 TABLET ORAL
Qty: 120 | Refills: 0
Start: 2019-05-18 | End: 2019-06-16

## 2019-05-18 RX ADMIN — Medication 5: at 12:18

## 2019-05-18 RX ADMIN — Medication 3 UNIT(S): at 12:21

## 2019-05-18 RX ADMIN — Medication 1 GRAM(S): at 05:31

## 2019-05-18 RX ADMIN — Medication 1 GRAM(S): at 12:22

## 2019-05-18 RX ADMIN — PANTOPRAZOLE SODIUM 40 MILLIGRAM(S): 20 TABLET, DELAYED RELEASE ORAL at 05:32

## 2019-05-18 NOTE — PROGRESS NOTE ADULT - ASSESSMENT
Briefly, this is a 38-year-old woman with a past medical history of poorly controlled T1DM who presented to the ED with complaints of worsening acid reflux symptoms over the past two months and was found to be hypoglycemic.  Reportedly, she was started on a medication for her GERD by her PCP that she said was ineffective.  Whether this is GERD or a component of diabetic gastroparesis is not clear.  Since starting the pantoprazole 40 mg once daily in the morning on an empty stomach and sucralfate 1 gm QID, she reports resolution of her symtpoms.  She should follow up as an outpatient, when she can be evaluated with an EGD and gastric emptying study.  Neither warrants inpatient workup.    Recommendations:  - Continue pantoprazole 40 mg PO daily  - Continue sucralfate 1 gm PO QID  - Follow up as outpatient for EGD and GES    Thank you for the consult.  GI will sign off.  Please reconsult as needed and call with any questions or concerns.     DIOR Neely MD  [facility]  Division of Gastroenterology  Tel [phone]  Fax [phone]

## 2019-05-18 NOTE — DISCHARGE NOTE PROVIDER - CARE PROVIDERS DIRECT ADDRESSES
,DirectAddress_Unknown,[email or web address]

## 2019-05-18 NOTE — PROGRESS NOTE ADULT - REASON FOR ADMISSION
labile blood glucose ranging from 31- 368-

## 2019-05-18 NOTE — DISCHARGE NOTE PROVIDER - HOSPITAL COURSE
38 year old female with type 1 DM admitted with symptoms of GERD and found to have severe Hypoglycemia, likely from use of very large doses of rapid acting insulin (admelog).  I suspect she may have irregular insulin absorption from injection into areas of lipohypertrophy.  For a Type 1 diabetic, the insulin doses are excessively large. 38 year old female with hx of type 1 DM admitted with symptoms of GERD and found to have severe hypoglycemia, likely from use of very large doses of rapid acting insulin (admelog).  Suspected irregular insulin absorption from injection into areas of lipohypertrophy.  For a Type 1 diabetic, the insulin doses are excessively large. Recommended by endo to c/w lantus at much lower dose 11 u sq qhs and lispro 3 u premeals. Pt educated and counseled on appropriate dosing, maintaining a journal and following with endocrine specialist for further management. Pt with stable fingersticks. Pt medically stable, cleared by endo to be discharged home. Pt also c/o gerd like sx  evaluated by GI and recommended to c/w PPI/ carafate and to f/u as an outpt for further management and testing with egd.                 Vital Signs Last 24 Hrs    T(C): 36.3 (18 May 2019 07:45), Max: 37.1 (17 May 2019 17:05)    T(F): 97.4 (18 May 2019 07:45), Max: 98.7 (17 May 2019 17:05)    HR: 86 (18 May 2019 07:45) (82 - 86)    BP: 109/72 (18 May 2019 07:45) (97/71 - 109/72)    BP(mean): --    RR: 19 (18 May 2019 07:45) (18 - 19)    SpO2: 100% (18 May 2019 07:45) (99% - 100%)                CONSTITUTIONAL: Well appearing, well nourished, awake, alert and in no apparent distress    CARDIAC: Normal rate, regular rhythm.  Heart sounds S1, S2.  No murmurs, rubs or gallops     RESPIRATORY: Breath sounds clear and equal bilaterally. No wheezes, rhales or rhonchi    GASTROENTEROLOGY: Soft nt nd bs + normoactive     EXTREMITIES: No edema, cyanosis or deformity     NEUROLOGICAL: Alert and oriented, no focal deficits, no motor or sensory deficits.    SKIN: No rash, skin turgor wnl         Discharge planning took 45 minutes

## 2019-05-18 NOTE — PROGRESS NOTE ADULT - SUBJECTIVE AND OBJECTIVE BOX
Chief Complaint: This is a 38y old Female patient being seen for follow-up consultation for GERD.    HPI / 24H events:  Patient reports resolution of her acid reflux symptoms.  She feels much better.  No new complaints.     ROS: A 14-point review of systems was reviewed and was otherwise negative save what was reported in the HPI.    PAST MEDICAL/SURGICAL HISTORY:  Diabetes  S/P  section: x3    MEDICATIONS  (STANDING):  dextrose 5%. 1000 milliLiter(s) (50 mL/Hr) IV Continuous <Continuous>  dextrose 50% Injectable 12.5 Gram(s) IV Push once  dextrose 50% Injectable 25 Gram(s) IV Push once  dextrose 50% Injectable 25 Gram(s) IV Push once  insulin glargine Injectable (LANTUS) 11 Unit(s) SubCutaneous at bedtime  insulin lispro (HumaLOG) corrective regimen sliding scale   SubCutaneous three times a day before meals  insulin lispro (HumaLOG) corrective regimen sliding scale   SubCutaneous at bedtime  insulin lispro Injectable (HumaLOG) 3 Unit(s) SubCutaneous three times a day before meals  pantoprazole    Tablet 40 milliGRAM(s) Oral before breakfast  sucralfate 1 Gram(s) Oral four times a day    MEDICATIONS  (PRN):  acetaminophen   Tablet .. 650 milliGRAM(s) Oral every 6 hours PRN Mild Pain (1 - 3)  dextrose 40% Gel 15 Gram(s) Oral once PRN Blood Glucose LESS THAN 70 milliGRAM(s)/deciliter  glucagon  Injectable 1 milliGRAM(s) IntraMuscular once PRN Glucose LESS THAN 70 milligrams/deciliter    VITAL SIGNS LAST 24 HOURS:  T(C): 36.3 (18 May 2019 07:45), Max: 37.1 (17 May 2019 17:05)  T(F): 97.4 (18 May 2019 07:45), Max: 98.7 (17 May 2019 17:05)  HR: 86 (18 May 2019 07:45) (82 - 86)  BP: 109/72 (18 May 2019 07:45) (97/71 - 109/72)  BP(mean): --  RR: 19 (18 May 2019 07:45) (18 - 19)  SpO2: 100% (18 May 2019 07:45) (99% - 100%)      PHYSICAL EXAM:  Constitutional: Well-developed, well-nourished, in no apparent distress  Eyes: Sclerae anicteric, conjunctivae normal  ENMT: Mucus membranes moist, no oropharyngeal thrush noted  Neck: No thyroid nodules appreciated, no significant cervical or supraclavicular lymphadenopathy  Respiratory: Breathing nonlabored; clear to auscultation  Cardiovascular: Regular rate and rhythm  Gastrointestinal: Soft, nontender, nondistended, normoactive bowel sounds; no hepatosplenomegaly appreciated; no rebound tenderness or involuntary guarding  Extremities: No clubbing, cyanosis or edema  Neurological: Alert and oriented to person, place and time; no asterixis  Skin: No jaundice  Lymph Nodes: No significant lymphadenopathy  Musculoskeletal: No significant peripheral atrophy  Psychiatric: Affect and mood appropriate

## 2019-05-18 NOTE — DISCHARGE NOTE NURSING/CASE MANAGEMENT/SOCIAL WORK - NSDCDPATPORTLINK_GEN_ALL_CORE
You can access the [facility] Portal, offered by [facility], by registering with the following website: [email or web address]

## 2019-05-18 NOTE — DISCHARGE NOTE PROVIDER - NSDCCPCAREPLAN_GEN_ALL_CORE_FT
PRINCIPAL DISCHARGE DIAGNOSIS  Diagnosis: Labile blood glucose  Assessment and Plan of Treatment: You have diabetes, and have been using a lot of insulin and were noted to have low sugars. Please take medications as prescribed, they were adjusted. Please follow up with the endocrinologist Dr. Laurent within one week. Maintain a journal of your sugar levels at home to show your specialist.      SECONDARY DISCHARGE DIAGNOSES  Diagnosis: Chronic GERD  Assessment and Plan of Treatment: Continue with medications as prescribed. Please avoid acidic foods, citrus/ spicy foods. Please follow up with the gastroenterologist Dr. Barnes as an outpatient for further management with possble endoscopy.

## 2019-05-18 NOTE — DISCHARGE NOTE PROVIDER - NSDCFUADDAPPT_GEN_ALL_CORE_FT
Please follow up with primary care physician in 3-5 days. Please follow up with endocrinoligst Dr. Laurent in one week. Please follow up with the gastroenterologist in 1-2 weeks.

## 2019-05-18 NOTE — DISCHARGE NOTE PROVIDER - CARE PROVIDER_API CALL
Berenice Stern)  EndocrinologyMetabDiabetes  [address], [place] [id number]  Phone: [phone]  Fax: [phone]  Follow Up Time:     Titi Starkey)  Gastroenterology; Internal Medicine  [address], [address]  [place] [address]  Phone: [phone]  Fax: [phone]  Follow Up Time:

## 2019-07-10 PROCEDURE — 84681 ASSAY OF C-PEPTIDE: CPT

## 2019-07-10 PROCEDURE — G0378: CPT

## 2019-07-10 PROCEDURE — 82533 TOTAL CORTISOL: CPT

## 2019-07-10 PROCEDURE — 96360 HYDRATION IV INFUSION INIT: CPT

## 2019-07-10 PROCEDURE — 85027 COMPLETE CBC AUTOMATED: CPT

## 2019-07-10 PROCEDURE — 80053 COMPREHEN METABOLIC PANEL: CPT

## 2019-07-10 PROCEDURE — T1013: CPT

## 2019-07-10 PROCEDURE — 83036 HEMOGLOBIN GLYCOSYLATED A1C: CPT

## 2019-07-10 PROCEDURE — 82962 GLUCOSE BLOOD TEST: CPT

## 2019-07-10 PROCEDURE — 99285 EMERGENCY DEPT VISIT HI MDM: CPT | Mod: 25

## 2019-07-10 PROCEDURE — 36415 COLL VENOUS BLD VENIPUNCTURE: CPT

## 2020-01-20 ENCOUNTER — INPATIENT (INPATIENT)
Facility: HOSPITAL | Age: 40
LOS: 0 days | Discharge: HOME | DRG: 639 | End: 2020-01-21
Attending: HOSPITALIST | Admitting: INTERNAL MEDICINE
Payer: MEDICAID

## 2020-01-20 VITALS
OXYGEN SATURATION: 100 % | TEMPERATURE: 98 F | WEIGHT: 125 LBS | RESPIRATION RATE: 18 BRPM | HEIGHT: 65 IN | HEART RATE: 113 BPM | SYSTOLIC BLOOD PRESSURE: 125 MMHG | DIASTOLIC BLOOD PRESSURE: 79 MMHG

## 2020-01-20 DIAGNOSIS — Z98.89 OTHER SPECIFIED POSTPROCEDURAL STATES: Chronic | ICD-10-CM

## 2020-01-20 DIAGNOSIS — E11.10 TYPE 2 DIABETES MELLITUS WITH KETOACIDOSIS WITHOUT COMA: ICD-10-CM

## 2020-01-20 LAB
ACETONE SERPL-MCNC: ABNORMAL
ALBUMIN SERPL ELPH-MCNC: 5.3 G/DL — HIGH (ref 3.3–5.2)
ALP SERPL-CCNC: 135 U/L — HIGH (ref 40–120)
ALT FLD-CCNC: 25 U/L — SIGNIFICANT CHANGE UP
ANION GAP SERPL CALC-SCNC: 12 MMOL/L — SIGNIFICANT CHANGE UP (ref 5–17)
ANION GAP SERPL CALC-SCNC: 27 MMOL/L — HIGH (ref 5–17)
AST SERPL-CCNC: 30 U/L — SIGNIFICANT CHANGE UP
BASE EXCESS BLDV CALC-SCNC: -10.8 MMOL/L — LOW (ref -2–2)
BASE EXCESS BLDV CALC-SCNC: -10.9 MMOL/L — LOW (ref -2–2)
BASOPHILS # BLD AUTO: 0.04 K/UL — SIGNIFICANT CHANGE UP (ref 0–0.2)
BASOPHILS NFR BLD AUTO: 0.5 % — SIGNIFICANT CHANGE UP (ref 0–2)
BILIRUB SERPL-MCNC: 0.9 MG/DL — SIGNIFICANT CHANGE UP (ref 0.4–2)
BUN SERPL-MCNC: 11 MG/DL — SIGNIFICANT CHANGE UP (ref 8–20)
BUN SERPL-MCNC: 19 MG/DL — SIGNIFICANT CHANGE UP (ref 8–20)
CA-I SERPL-SCNC: 1.2 MMOL/L — SIGNIFICANT CHANGE UP (ref 1.15–1.33)
CALCIUM SERPL-MCNC: 10.1 MG/DL — SIGNIFICANT CHANGE UP (ref 8.6–10.2)
CALCIUM SERPL-MCNC: 7 MG/DL — LOW (ref 8.6–10.2)
CHLORIDE BLDV-SCNC: 101 MMOL/L — SIGNIFICANT CHANGE UP (ref 98–107)
CHLORIDE SERPL-SCNC: 106 MMOL/L — SIGNIFICANT CHANGE UP (ref 98–107)
CHLORIDE SERPL-SCNC: 94 MMOL/L — LOW (ref 98–107)
CO2 SERPL-SCNC: 16 MMOL/L — LOW (ref 22–29)
CO2 SERPL-SCNC: 21 MMOL/L — LOW (ref 22–29)
CREAT SERPL-MCNC: 0.43 MG/DL — LOW (ref 0.5–1.3)
CREAT SERPL-MCNC: 0.85 MG/DL — SIGNIFICANT CHANGE UP (ref 0.5–1.3)
EOSINOPHIL # BLD AUTO: 0.01 K/UL — SIGNIFICANT CHANGE UP (ref 0–0.5)
EOSINOPHIL NFR BLD AUTO: 0.1 % — SIGNIFICANT CHANGE UP (ref 0–6)
GAS PNL BLDV: 140 MMOL/L — SIGNIFICANT CHANGE UP (ref 135–145)
GAS PNL BLDV: SIGNIFICANT CHANGE UP
GLUCOSE BLDC GLUCOMTR-MCNC: 133 MG/DL — HIGH (ref 70–99)
GLUCOSE BLDC GLUCOMTR-MCNC: 145 MG/DL — HIGH (ref 70–99)
GLUCOSE BLDC GLUCOMTR-MCNC: 150 MG/DL — HIGH (ref 70–99)
GLUCOSE BLDC GLUCOMTR-MCNC: 167 MG/DL — HIGH (ref 70–99)
GLUCOSE BLDC GLUCOMTR-MCNC: 195 MG/DL — HIGH (ref 70–99)
GLUCOSE BLDC GLUCOMTR-MCNC: 263 MG/DL — HIGH (ref 70–99)
GLUCOSE BLDC GLUCOMTR-MCNC: 98 MG/DL — SIGNIFICANT CHANGE UP (ref 70–99)
GLUCOSE BLDV-MCNC: 297 MG/DL — HIGH (ref 70–99)
GLUCOSE SERPL-MCNC: 154 MG/DL — HIGH (ref 70–115)
GLUCOSE SERPL-MCNC: 332 MG/DL — HIGH (ref 70–115)
HCG SERPL-ACNC: <4 MIU/ML — SIGNIFICANT CHANGE UP
HCO3 BLDV-SCNC: 16 MMOL/L — LOW (ref 20–26)
HCO3 BLDV-SCNC: 16 MMOL/L — LOW (ref 20–26)
HCOV PNL SPEC NAA+PROBE: DETECTED
HCT VFR BLD CALC: 48.1 % — HIGH (ref 34.5–45)
HCT VFR BLDA CALC: 48 — SIGNIFICANT CHANGE UP (ref 39–50)
HGB BLD CALC-MCNC: 15.7 G/DL — HIGH (ref 11.5–15.5)
HGB BLD-MCNC: 14.9 G/DL — SIGNIFICANT CHANGE UP (ref 11.5–15.5)
IMM GRANULOCYTES NFR BLD AUTO: 0.5 % — SIGNIFICANT CHANGE UP (ref 0–1.5)
LACTATE BLDV-MCNC: 6.1 MMOL/L — CRITICAL HIGH (ref 0.5–2)
LYMPHOCYTES # BLD AUTO: 0.6 K/UL — LOW (ref 1–3.3)
LYMPHOCYTES # BLD AUTO: 7.1 % — LOW (ref 13–44)
MAGNESIUM SERPL-MCNC: 1.6 MG/DL — SIGNIFICANT CHANGE UP (ref 1.6–2.6)
MCHC RBC-ENTMCNC: 28.2 PG — SIGNIFICANT CHANGE UP (ref 27–34)
MCHC RBC-ENTMCNC: 31 GM/DL — LOW (ref 32–36)
MCV RBC AUTO: 90.9 FL — SIGNIFICANT CHANGE UP (ref 80–100)
MONOCYTES # BLD AUTO: 0.32 K/UL — SIGNIFICANT CHANGE UP (ref 0–0.9)
MONOCYTES NFR BLD AUTO: 3.8 % — SIGNIFICANT CHANGE UP (ref 2–14)
NEUTROPHILS # BLD AUTO: 7.43 K/UL — HIGH (ref 1.8–7.4)
NEUTROPHILS NFR BLD AUTO: 88 % — HIGH (ref 43–77)
OTHER CELLS CSF MANUAL: 16 ML/DL — LOW (ref 18–22)
PCO2 BLDV: 43 MMHG — SIGNIFICANT CHANGE UP (ref 35–50)
PCO2 BLDV: 44 MMHG — SIGNIFICANT CHANGE UP (ref 35–50)
PH BLDV: 7.19 — CRITICAL LOW (ref 7.32–7.43)
PH BLDV: 7.2 — CRITICAL LOW (ref 7.32–7.43)
PHOSPHATE SERPL-MCNC: 2.2 MG/DL — LOW (ref 2.4–4.7)
PLATELET # BLD AUTO: 268 K/UL — SIGNIFICANT CHANGE UP (ref 150–400)
PO2 BLDV: 45 MMHG — SIGNIFICANT CHANGE UP (ref 25–45)
PO2 BLDV: 46 MMHG — HIGH (ref 25–45)
POTASSIUM BLDV-SCNC: 3.9 MMOL/L — SIGNIFICANT CHANGE UP (ref 3.4–4.5)
POTASSIUM SERPL-MCNC: 3.6 MMOL/L — SIGNIFICANT CHANGE UP (ref 3.5–5.3)
POTASSIUM SERPL-MCNC: 4 MMOL/L — SIGNIFICANT CHANGE UP (ref 3.5–5.3)
POTASSIUM SERPL-SCNC: 3.6 MMOL/L — SIGNIFICANT CHANGE UP (ref 3.5–5.3)
POTASSIUM SERPL-SCNC: 4 MMOL/L — SIGNIFICANT CHANGE UP (ref 3.5–5.3)
PROT SERPL-MCNC: 8.5 G/DL — SIGNIFICANT CHANGE UP (ref 6.6–8.7)
RAPID RVP RESULT: DETECTED
RBC # BLD: 5.29 M/UL — HIGH (ref 3.8–5.2)
RBC # FLD: 12.8 % — SIGNIFICANT CHANGE UP (ref 10.3–14.5)
SAO2 % BLDV: 72 % — SIGNIFICANT CHANGE UP
SAO2 % BLDV: 73 % — SIGNIFICANT CHANGE UP
SODIUM SERPL-SCNC: 137 MMOL/L — SIGNIFICANT CHANGE UP (ref 135–145)
SODIUM SERPL-SCNC: 139 MMOL/L — SIGNIFICANT CHANGE UP (ref 135–145)
WBC # BLD: 8.44 K/UL — SIGNIFICANT CHANGE UP (ref 3.8–10.5)
WBC # FLD AUTO: 8.44 K/UL — SIGNIFICANT CHANGE UP (ref 3.8–10.5)

## 2020-01-20 PROCEDURE — 99232 SBSQ HOSP IP/OBS MODERATE 35: CPT

## 2020-01-20 PROCEDURE — 93010 ELECTROCARDIOGRAM REPORT: CPT

## 2020-01-20 PROCEDURE — 99291 CRITICAL CARE FIRST HOUR: CPT

## 2020-01-20 PROCEDURE — 99233 SBSQ HOSP IP/OBS HIGH 50: CPT

## 2020-01-20 PROCEDURE — 99222 1ST HOSP IP/OBS MODERATE 55: CPT

## 2020-01-20 RX ORDER — DEXTROSE 50 % IN WATER 50 %
25 SYRINGE (ML) INTRAVENOUS ONCE
Refills: 0 | Status: DISCONTINUED | OUTPATIENT
Start: 2020-01-20 | End: 2020-01-21

## 2020-01-20 RX ORDER — GLUCAGON INJECTION, SOLUTION 0.5 MG/.1ML
1 INJECTION, SOLUTION SUBCUTANEOUS ONCE
Refills: 0 | Status: DISCONTINUED | OUTPATIENT
Start: 2020-01-20 | End: 2020-01-21

## 2020-01-20 RX ORDER — INSULIN LISPRO 100/ML
2 VIAL (ML) SUBCUTANEOUS
Refills: 0 | Status: DISCONTINUED | OUTPATIENT
Start: 2020-01-20 | End: 2020-01-21

## 2020-01-20 RX ORDER — MAGNESIUM SULFATE 500 MG/ML
2 VIAL (ML) INJECTION ONCE
Refills: 0 | Status: COMPLETED | OUTPATIENT
Start: 2020-01-20 | End: 2020-01-20

## 2020-01-20 RX ORDER — SODIUM CHLORIDE 9 MG/ML
1000 INJECTION, SOLUTION INTRAVENOUS ONCE
Refills: 0 | Status: COMPLETED | OUTPATIENT
Start: 2020-01-20 | End: 2020-01-20

## 2020-01-20 RX ORDER — SODIUM CHLORIDE 9 MG/ML
2000 INJECTION INTRAMUSCULAR; INTRAVENOUS; SUBCUTANEOUS ONCE
Refills: 0 | Status: COMPLETED | OUTPATIENT
Start: 2020-01-20 | End: 2020-01-20

## 2020-01-20 RX ORDER — DEXTROSE 50 % IN WATER 50 %
12.5 SYRINGE (ML) INTRAVENOUS ONCE
Refills: 0 | Status: DISCONTINUED | OUTPATIENT
Start: 2020-01-20 | End: 2020-01-21

## 2020-01-20 RX ORDER — ONDANSETRON 8 MG/1
4 TABLET, FILM COATED ORAL ONCE
Refills: 0 | Status: COMPLETED | OUTPATIENT
Start: 2020-01-20 | End: 2020-01-20

## 2020-01-20 RX ORDER — POTASSIUM CHLORIDE 20 MEQ
10 PACKET (EA) ORAL
Refills: 0 | Status: COMPLETED | OUTPATIENT
Start: 2020-01-20 | End: 2020-01-20

## 2020-01-20 RX ORDER — ENOXAPARIN SODIUM 100 MG/ML
40 INJECTION SUBCUTANEOUS DAILY
Refills: 0 | Status: DISCONTINUED | OUTPATIENT
Start: 2020-01-20 | End: 2020-01-21

## 2020-01-20 RX ORDER — SODIUM CHLORIDE 9 MG/ML
1000 INJECTION, SOLUTION INTRAVENOUS
Refills: 0 | Status: DISCONTINUED | OUTPATIENT
Start: 2020-01-20 | End: 2020-01-21

## 2020-01-20 RX ORDER — INSULIN GLARGINE 100 [IU]/ML
12 INJECTION, SOLUTION SUBCUTANEOUS ONCE
Refills: 0 | Status: COMPLETED | OUTPATIENT
Start: 2020-01-20 | End: 2020-01-20

## 2020-01-20 RX ORDER — DEXTROSE 50 % IN WATER 50 %
15 SYRINGE (ML) INTRAVENOUS ONCE
Refills: 0 | Status: DISCONTINUED | OUTPATIENT
Start: 2020-01-20 | End: 2020-01-21

## 2020-01-20 RX ORDER — INSULIN GLARGINE 100 [IU]/ML
12 INJECTION, SOLUTION SUBCUTANEOUS EVERY MORNING
Refills: 0 | Status: DISCONTINUED | OUTPATIENT
Start: 2020-01-21 | End: 2020-01-21

## 2020-01-20 RX ORDER — POTASSIUM PHOSPHATE, MONOBASIC POTASSIUM PHOSPHATE, DIBASIC 236; 224 MG/ML; MG/ML
15 INJECTION, SOLUTION INTRAVENOUS ONCE
Refills: 0 | Status: COMPLETED | OUTPATIENT
Start: 2020-01-20 | End: 2020-01-20

## 2020-01-20 RX ORDER — CHLORHEXIDINE GLUCONATE 213 G/1000ML
1 SOLUTION TOPICAL
Refills: 0 | Status: DISCONTINUED | OUTPATIENT
Start: 2020-01-20 | End: 2020-01-20

## 2020-01-20 RX ORDER — SODIUM CHLORIDE 9 MG/ML
1000 INJECTION, SOLUTION INTRAVENOUS
Refills: 0 | Status: DISCONTINUED | OUTPATIENT
Start: 2020-01-20 | End: 2020-01-20

## 2020-01-20 RX ORDER — INSULIN HUMAN 100 [IU]/ML
10 INJECTION, SOLUTION SUBCUTANEOUS ONCE
Refills: 0 | Status: COMPLETED | OUTPATIENT
Start: 2020-01-20 | End: 2020-01-20

## 2020-01-20 RX ORDER — INSULIN LISPRO 100/ML
VIAL (ML) SUBCUTANEOUS
Refills: 0 | Status: DISCONTINUED | OUTPATIENT
Start: 2020-01-20 | End: 2020-01-21

## 2020-01-20 RX ORDER — INSULIN HUMAN 100 [IU]/ML
3 INJECTION, SOLUTION SUBCUTANEOUS
Qty: 100 | Refills: 0 | Status: DISCONTINUED | OUTPATIENT
Start: 2020-01-20 | End: 2020-01-20

## 2020-01-20 RX ADMIN — Medication 1: at 12:08

## 2020-01-20 RX ADMIN — ENOXAPARIN SODIUM 40 MILLIGRAM(S): 100 INJECTION SUBCUTANEOUS at 12:08

## 2020-01-20 RX ADMIN — Medication 100 MILLIEQUIVALENT(S): at 16:26

## 2020-01-20 RX ADMIN — Medication 100 MILLIEQUIVALENT(S): at 17:55

## 2020-01-20 RX ADMIN — POTASSIUM PHOSPHATE, MONOBASIC POTASSIUM PHOSPHATE, DIBASIC 62.5 MILLIMOLE(S): 236; 224 INJECTION, SOLUTION INTRAVENOUS at 10:46

## 2020-01-20 RX ADMIN — ONDANSETRON 4 MILLIGRAM(S): 8 TABLET, FILM COATED ORAL at 04:10

## 2020-01-20 RX ADMIN — INSULIN HUMAN 10 UNIT(S): 100 INJECTION, SOLUTION SUBCUTANEOUS at 03:00

## 2020-01-20 RX ADMIN — INSULIN GLARGINE 12 UNIT(S): 100 INJECTION, SOLUTION SUBCUTANEOUS at 10:50

## 2020-01-20 RX ADMIN — SODIUM CHLORIDE 2000 MILLILITER(S): 9 INJECTION INTRAMUSCULAR; INTRAVENOUS; SUBCUTANEOUS at 04:10

## 2020-01-20 RX ADMIN — Medication 2 UNIT(S): at 16:51

## 2020-01-20 RX ADMIN — SODIUM CHLORIDE 1000 MILLILITER(S): 9 INJECTION, SOLUTION INTRAVENOUS at 06:32

## 2020-01-20 RX ADMIN — Medication 2 UNIT(S): at 12:07

## 2020-01-20 RX ADMIN — SODIUM CHLORIDE 4000 MILLILITER(S): 9 INJECTION, SOLUTION INTRAVENOUS at 05:59

## 2020-01-20 RX ADMIN — Medication 3: at 16:52

## 2020-01-20 RX ADMIN — Medication 50 GRAM(S): at 10:46

## 2020-01-20 RX ADMIN — Medication 100 MILLIEQUIVALENT(S): at 22:44

## 2020-01-20 NOTE — PROGRESS NOTE ADULT - SUBJECTIVE AND OBJECTIVE BOX
Patient is a 39y old  Female who presents with a chief complaint of DKA (2020 12:54)      BRIEF HOSPITAL COURSE:   Admitted with DKA possibly from systemic viral illness     Events last 24 hours:   AG closed   NO N/V    PAST MEDICAL & SURGICAL HISTORY:  Diabetes  S/P  section: x3      All systems reviewed with symptoms mentions as above.       Physical Examination:    ICU Vital Signs Last 24 Hrs  T(C): 36.7 (2020 11:32), Max: 36.9 (2020 03:51)  T(F): 98.1 (2020 11:32), Max: 98.5 (2020 06:15)  HR: 85 (2020 12:00) (77 - 113)  BP: 105/58 (2020 12:00) (89/57 - 125/79)  BP(mean): 77 (2020 12:00) (68 - 77)  ABP: --  ABP(mean): --  RR: 10 (2020 12:00) (10 - 23)  SpO2: 100% (2020 12:00) (99% - 100%)      General: No acute distress.      Neuro: AAO*3, No motor, sensory, or cranial nerve deficit    HEENT: Pupils equal, reactive to light, Oral mucosa    PULM: Clear to auscultation bilaterally, no significant adventitious breath sounds     CVS: Regular rhythm and controlled rate, no murmurs, rubs, or gallops    ABD: Soft, nondistended, nontender, normoactive bowel sounds, no CVA tenderness    EXT: No b/l LE edema, nontender with pedal pulse palpable     SKIN: Warm and well perfused, no acute rashes       Medications:            enoxaparin Injectable 40 milliGRAM(s) SubCutaneous daily        dextrose 40% Gel 15 Gram(s) Oral once PRN  dextrose 50% Injectable 12.5 Gram(s) IV Push once  dextrose 50% Injectable 25 Gram(s) IV Push once  dextrose 50% Injectable 25 Gram(s) IV Push once  glucagon  Injectable 1 milliGRAM(s) IntraMuscular once PRN  insulin lispro (HumaLOG) corrective regimen sliding scale   SubCutaneous three times a day before meals  insulin lispro Injectable (HumaLOG) 2 Unit(s) SubCutaneous three times a day before meals    dextrose 5%. 1000 milliLiter(s) IV Continuous <Continuous>  potassium chloride  10 mEq/100 mL IVPB 10 milliEquivalent(s) IV Intermittent every 1 hour                I&O's Detail    2020 07:  -  2020 07:00  --------------------------------------------------------  IN:    dextrose 5% + sodium chloride 0.45%.: 400 mL    insulin regular Infusion: 4.5 mL  Total IN: 404.5 mL    OUT:  Total OUT: 0 mL    Total NET: 404.5 mL      2020 07:01  -  2020 15:39  --------------------------------------------------------  IN:    dextrose 5% + sodium chloride 0.45%.: 400 mL    insulin regular Infusion: 3 mL  Total IN: 403 mL    OUT:  Total OUT: 0 mL    Total NET: 403 mL    RADIOLOGY/ Microbiology/ Labs: reviewed     CRITICAL CARE TIME SPENT: 35 min

## 2020-01-20 NOTE — PROGRESS NOTE ADULT - ASSESSMENT
39yr old female with PMH of Type1 Diabetes since last yrs presented to ED on 1/19 with nausea and vomiting, pain behind her neck after vomiting. Her daughter was sick last week and she was feeling since yesterday. In the ER, she was found to be in DKA with BG of 501, HCO3 16, Anion Gap of 27, pH of 7.2, She was admitted to MICU and was treated with iv fluids, insulin drip. her AG has closed and her symptoms have improved. MICU has deemed her stable for RNF.   she denies any nausea or URI symptoms currently. was given Lantus 12 u and Humalog 2units TID.     # Type1 DM with DKA -   ct lantus and humalog at current doses as BS are running in 90-100s, once tolerates PO may need to increase meal coverage   at home patient takes admelog 50units with breakfast and lunch and then Basaglar 40U HS. Follows with PMD  monitor BS and labs   repeat labs in am     # hypokalemia 0 replete   # hypomagnesemia - Replete   # URI 2/2 coronavirus  - currently denies any symptoms - Tylenol PRN   # DVT Px

## 2020-01-20 NOTE — H&P ADULT - NSHPPHYSICALEXAM_GEN_ALL_CORE
GENERAL: Middle aged female, resting comfortably  HEENT: NC/AT, flushed cheeks, PERRLA   CV: RRR, no murmurs, rubs or gallops appreciated  RESP: Symmetrical thorax expansion upon respiration. CTA bilaterally.   ABD: Soft, nontender, nondistended, normoactive bowel sounds, no masses appreciated  EXT: No edema, nontender, DP pulses symmetrical   SKIN: Warm, intact, no rashes appreciated  NEURO: A&O x3, interactive, nonfocal

## 2020-01-20 NOTE — ED PROVIDER NOTE - OBJECTIVE STATEMENT
Amrit. 38 yo female PMHx IDDM2 presents to ED c/o hyperglycemia.  Amrit. 40 yo female PMHx IDDM2 presents to ED c/o vomiting. Patient with nausea and vomiting, associated with neck pain after vomiting. Patient ate soup earlier in day prior to emesis. Patient checks sugar TID;  3 hours PTA; administered Amedlog 50U. Regimen Metformin BID, unknown dosage; Amedlog 50U morning and at lunch; 30U of unknown at bedtime. No further complaints at this time.   Denies abdominal, diarrhea.   PCP: Renuka Mae  Endo: None

## 2020-01-20 NOTE — PROGRESS NOTE ADULT - ASSESSMENT
1: DKA  2: Systemic viral illness with coronavirus   3: Hypokalemia   4: Hypomagnesemia     Patient seen and exmained   Started on Lantus and stopped insulin infusion earlier this AM  Endo consulted   S/p IVF  Needs electrolytes repletion and closer f/u   Could be transferred out of MICU

## 2020-01-20 NOTE — CONSULT NOTE ADULT - SUBJECTIVE AND OBJECTIVE BOX
HPI:  39 year old female with type 1 DM who presented to ED with abdominal pain, N/ V who was found to be in moderate DKA with BG of 501, HCO3 16, Anion Gap of 27, pH of 7.2.  She was treated with IVFs and insulin gtt and her gap closed rapidly.  This morning she was given 12 units of Lantus and her insulin gtt was stopped.  Patient denied missing any doses of insulin prior to admission.  Her respiratory panel has come back postive for coronavirus.  She denies any fevers or dyspnea.      She was diagnosed with Type 1 DM about 9 years ago.  She is followed by an endocrinologist, but does not recall who.  She denies any associated microvascular complications.  Her control has improved with the use of insulin.  She claims to be injecting Admelog 50 units with breakfast and lunch (does not take with dinner) and Basaglar 40 units after dinner.  She tests her blood glucose 3XD at home.      PAST MEDICAL & SURGICAL HISTORY:  Diabetes  S/P  section: x3    Allergies  No Known Allergies    MEDICATIONS  (STANDING):  dextrose 5%. 1000 milliLiter(s) (50 mL/Hr) IV Continuous <Continuous>  dextrose 50% Injectable 12.5 Gram(s) IV Push once  dextrose 50% Injectable 25 Gram(s) IV Push once  dextrose 50% Injectable 25 Gram(s) IV Push once  enoxaparin Injectable 40 milliGRAM(s) SubCutaneous daily  insulin lispro (HumaLOG) corrective regimen sliding scale   SubCutaneous three times a day before meals  insulin lispro Injectable (HumaLOG) 2 Unit(s) SubCutaneous three times a day before meals  potassium chloride  10 mEq/100 mL IVPB 10 milliEquivalent(s) IV Intermittent every 1 hour    SH:  no smoking or EtOH.  Works with elderly    FH:  sister has DM    ROS:  as per HPI, otherwise 10 point ROS negative.    Vital Signs Last 24 Hrs  T(C): 36.7 (2020 11:32), Max: 36.9 (2020 03:51)  T(F): 98.1 (2020 11:32), Max: 98.5 (2020 06:15)  HR: 77 (2020 10:45) (77 - 113)  BP: 89/57 (2020 10:00) (89/57 - 125/79)  BP(mean): 69 (2020 10:00) (68 - 77)  RR: 10 (2020 10:45) (10 - 23)  SpO2: 100% (2020 10:45) (99% - 100%)    PE:  Gen: AAO, NAD  HEENT:  sclera anicteric, MMM  Neck:  no thyromegaly, no LAD  CV:  nl S1 + S2, RRR, no m/r/g  Resp:  nl respiratory effort, CTA b/l  GI/ Abd: soft, NT/ ND, BS +  Neuro:  No tremor, sensation intact to light touch on b/l feet  MS:  no c/c/e, nl muscle tone  Skin:  no foot ulcers, no rashes  psych:  affect appropriate    LABS:  Hemoglobin A1C, Whole Blood: 9.1 % (05.15.19 @ 14:21)        139  |  106  |  11.0  ----------------------------<  154<H>  3.6   |  21.0<L>  |  0.43<L>    Ca    7.0<L>      2020 08:35  Phos  2.2       Mg     1.6         TPro  8.5  /  Alb  5.3<H>  /  TBili  0.9  /  DBili  x   /  AST  30  /  ALT  25  /  AlkPhos  135<H>                            14.9   8.44  )-----------( 268      ( 2020 03:45 )             48.1     CAPILLARY BLOOD GLUCOSE  POCT Blood Glucose.: 167 mg/dL (2020 12:06)  POCT Blood Glucose.: 98 mg/dL (2020 10:45)  POCT Blood Glucose.: 133 mg/dL (2020 09:11)  POCT Blood Glucose.: 145 mg/dL (2020 08:06)  POCT Blood Glucose.: 150 mg/dL (2020 06:55)  POCT Blood Glucose.: 234 mg/dL (2020 04:06)  POCT Blood Glucose.: 501 mg/dL (2020 01:41)

## 2020-01-20 NOTE — ED ADULT NURSE NOTE - CHPI ED NUR SYMPTOMS NEG
no weakness/no dizziness/no chills/no fever/no nausea/no pain/no tingling/no vomiting/no decreased eating/drinking

## 2020-01-20 NOTE — H&P ADULT - HISTORY OF PRESENT ILLNESS
40 yo  female with DM1 (diagnosed 8 years ago) presented from home with complaints of abdomina pain, nausea/vomiting.  Patient endorses that overnight she wasn't feeling well, patient started with abdominal pain that progressed to nausea and vomiting.  Patient denies hematemesis, diarrhea, hematochezia fever, chills, body aches, urgency, frequency polyuria or polydipsia. Patient taking her insulin as directed and endorses recent sick contact, daughter had the flu last week.

## 2020-01-20 NOTE — ED ADULT TRIAGE NOTE - CHIEF COMPLAINT QUOTE
patient states that she has neck pain after nauseous and vomiting tonight patient also states that she is a  DM and states that he numbers are high

## 2020-01-20 NOTE — CONSULT NOTE ADULT - ASSESSMENT
39 year old female with Type 1 DM uncontrolled admitted with DKA in setting of viral URI.  Her DKA has resolved after IVFs and insulin gtt.      1.  Type 1 DM-  I suspect that patient may be injecting insulin improperly at home as her home doses seem excessive and this admission she is relatively insulin sensitive.  Will need further work with nursing and CDE prior to discharge to review proper injection technique and site rotation.  For now, would continue low insulin doses and slowly titrate as needed based on BG.  Will likely need increased prandial doses as appetite improves.  Check A1c.   2.  URI-  supportive care as per medicine.

## 2020-01-20 NOTE — ED PROVIDER NOTE - CLINICAL SUMMARY MEDICAL DECISION MAKING FREE TEXT BOX
dehydration plus dka plus lactic acidosis likely 2/2 dka + metformin use hydration icu recs impleemnted multiple bedside re evalv performed to assess heodynamic stability

## 2020-01-20 NOTE — PROGRESS NOTE ADULT - SUBJECTIVE AND OBJECTIVE BOX
MEDICINCE TRANSFER NOTE     CC: Nausea, vomiting     HPI in brief: 39yr old female with PMH of Type1 Diabetes since last yrs presented to ED on 1/19 with nausea and vomiting, pain behind her neck after vomiting. Her daughter was sick last week and she was feeling since yesterday. In the ER, she was found to be in DKA with BG of 501, HCO3 16, Anion Gap of 27, pH of 7.2, She was admitted to MICU and was treated with iv fluids, insulin drip. her AG has closed and her symptoms have improved. MICU has deemed her stable for RNF.   she denies any nausea or URI symptoms currently. was given Lantus 12 u and Humalog 2units TID.       INTERVAL HPI/OVERNIGHT EVENTS: as in HPI     REVIEW OF SYSTEMS:    CONSTITUTIONAL: No fever,  fatigue  RESPIRATORY: No cough, wheezing,  No shortness of breath  CARDIOVASCULAR: No chest pain, palpitations  GASTROINTESTINAL: No abdominal or epigastric pain. No nausea, vomiting        Vital Signs Last 24 Hrs  T(C): 36.7 (20 Jan 2020 11:32), Max: 36.9 (20 Jan 2020 03:51)  T(F): 98.1 (20 Jan 2020 11:32), Max: 98.5 (20 Jan 2020 06:15)  HR: 85 (20 Jan 2020 12:00) (77 - 113)  BP: 105/58 (20 Jan 2020 12:00) (89/57 - 125/79)  BP(mean): 77 (20 Jan 2020 12:00) (68 - 77)  RR: 10 (20 Jan 2020 12:00) (10 - 23)  SpO2: 100% (20 Jan 2020 12:00) (99% - 100%)    PHYSICAL EXAM:    GENERAL: NAD, well-groomed  HEENT: PERRL, +EOMI  NECK: soft, Supple, No JVD,   CHEST/LUNG: Clear to percussion bilaterally; No wheezing  HEART: S1S2+, Regular rate and rhythm; No murmurs  ABDOMEN: Soft, Nontender, Nondistended; Bowel sounds present  EXTREMITIES:  No clubbing, cyanosis, or edema  SKIN: No rashes or lesions  NEURO: AAOX3        01-19 @ 07:01  -  01-20 @ 07:00  --------------------------------------------------------  IN: 404.5 mL / OUT: 0 mL / NET: 404.5 mL    01-20 @ 07:01  -  01-20 @ 12:54  --------------------------------------------------------  IN: 403 mL / OUT: 0 mL / NET: 403 mL        LABS:                        14.9   8.44  )-----------( 268      ( 20 Jan 2020 03:45 )             48.1     01-20    139  |  106  |  11.0  ----------------------------<  154<H>  3.6   |  21.0<L>  |  0.43<L>    Ca    7.0<L>      20 Jan 2020 08:35  Phos  2.2     01-20  Mg     1.6     01-20    TPro  8.5  /  Alb  5.3<H>  /  TBili  0.9  /  DBili  x   /  AST  30  /  ALT  25  /  AlkPhos  135<H>  01-20            MEDICATIONS  (STANDING):  dextrose 5%. 1000 milliLiter(s) (50 mL/Hr) IV Continuous <Continuous>  dextrose 50% Injectable 12.5 Gram(s) IV Push once  dextrose 50% Injectable 25 Gram(s) IV Push once  dextrose 50% Injectable 25 Gram(s) IV Push once  enoxaparin Injectable 40 milliGRAM(s) SubCutaneous daily  insulin lispro (HumaLOG) corrective regimen sliding scale   SubCutaneous three times a day before meals  insulin lispro Injectable (HumaLOG) 2 Unit(s) SubCutaneous three times a day before meals  potassium chloride  10 mEq/100 mL IVPB 10 milliEquivalent(s) IV Intermittent every 1 hour    MEDICATIONS  (PRN):  dextrose 40% Gel 15 Gram(s) Oral once PRN Blood Glucose LESS THAN 70 milliGRAM(s)/deciliter  glucagon  Injectable 1 milliGRAM(s) IntraMuscular once PRN Glucose LESS THAN 70 milligrams/deciliter      RADIOLOGY & ADDITIONAL TESTS:

## 2020-01-20 NOTE — ED PROVIDER NOTE - ATTENDING CONTRIBUTION TO CARE
I personally saw the patient with the PA, and completed the key components of the history and physical exam. I then discussed the management plan with the PA.   gen in nad resp clear cardiac nom umrur  abd soft nt no cvat neuro nml

## 2020-01-20 NOTE — H&P ADULT - ASSESSMENT
40 yo  female with DM1 (diagnosed 8 years ago) admitted with     1. DKA    NEURO: No active issues  CV: No active issues  RESP: No active issues.   RENAL: monitor lytes, replace as needed  GI: NPO except medications, water and ice chips  ENDO: DKA, admit to MICU, q1hr POCT, insulin gtt, q4-6hr bmp. Endocrine consult  ID: Unlikely active infectious process, afebrile, WBC <10  HEME: Lovenox for VTE ppx  DISPO: Full code.

## 2020-01-21 ENCOUNTER — TRANSCRIPTION ENCOUNTER (OUTPATIENT)
Age: 40
End: 2020-01-21

## 2020-01-21 LAB
ANION GAP SERPL CALC-SCNC: 10 MMOL/L — SIGNIFICANT CHANGE UP (ref 5–17)
BUN SERPL-MCNC: 6 MG/DL — LOW (ref 8–20)
CALCIUM SERPL-MCNC: 8.2 MG/DL — LOW (ref 8.6–10.2)
CHLORIDE SERPL-SCNC: 101 MMOL/L — SIGNIFICANT CHANGE UP (ref 98–107)
CO2 SERPL-SCNC: 22 MMOL/L — SIGNIFICANT CHANGE UP (ref 22–29)
CREAT SERPL-MCNC: 0.35 MG/DL — LOW (ref 0.5–1.3)
GLUCOSE BLDC GLUCOMTR-MCNC: 249 MG/DL — HIGH (ref 70–99)
GLUCOSE BLDC GLUCOMTR-MCNC: 279 MG/DL — HIGH (ref 70–99)
GLUCOSE BLDC GLUCOMTR-MCNC: 287 MG/DL — HIGH (ref 70–99)
GLUCOSE SERPL-MCNC: 262 MG/DL — HIGH (ref 70–115)
HBA1C BLD-MCNC: 10.6 % — HIGH (ref 4–5.6)
HCT VFR BLD CALC: 38 % — SIGNIFICANT CHANGE UP (ref 34.5–45)
HGB BLD-MCNC: 11.6 G/DL — SIGNIFICANT CHANGE UP (ref 11.5–15.5)
MAGNESIUM SERPL-MCNC: 1.9 MG/DL — SIGNIFICANT CHANGE UP (ref 1.6–2.6)
MCHC RBC-ENTMCNC: 27.3 PG — SIGNIFICANT CHANGE UP (ref 27–34)
MCHC RBC-ENTMCNC: 30.5 GM/DL — LOW (ref 32–36)
MCV RBC AUTO: 89.4 FL — SIGNIFICANT CHANGE UP (ref 80–100)
PLATELET # BLD AUTO: 176 K/UL — SIGNIFICANT CHANGE UP (ref 150–400)
POTASSIUM SERPL-MCNC: 4.6 MMOL/L — SIGNIFICANT CHANGE UP (ref 3.5–5.3)
POTASSIUM SERPL-SCNC: 4.6 MMOL/L — SIGNIFICANT CHANGE UP (ref 3.5–5.3)
RBC # BLD: 4.25 M/UL — SIGNIFICANT CHANGE UP (ref 3.8–5.2)
RBC # FLD: 13.2 % — SIGNIFICANT CHANGE UP (ref 10.3–14.5)
SODIUM SERPL-SCNC: 133 MMOL/L — LOW (ref 135–145)
WBC # BLD: 3.25 K/UL — LOW (ref 3.8–10.5)
WBC # FLD AUTO: 3.25 K/UL — LOW (ref 3.8–10.5)

## 2020-01-21 PROCEDURE — 99239 HOSP IP/OBS DSCHRG MGMT >30: CPT

## 2020-01-21 PROCEDURE — 80053 COMPREHEN METABOLIC PANEL: CPT

## 2020-01-21 PROCEDURE — 99285 EMERGENCY DEPT VISIT HI MDM: CPT | Mod: 25

## 2020-01-21 PROCEDURE — 87486 CHLMYD PNEUM DNA AMP PROBE: CPT

## 2020-01-21 PROCEDURE — 83605 ASSAY OF LACTIC ACID: CPT

## 2020-01-21 PROCEDURE — 85027 COMPLETE CBC AUTOMATED: CPT

## 2020-01-21 PROCEDURE — 82947 ASSAY GLUCOSE BLOOD QUANT: CPT

## 2020-01-21 PROCEDURE — 87581 M.PNEUMON DNA AMP PROBE: CPT

## 2020-01-21 PROCEDURE — 80048 BASIC METABOLIC PNL TOTAL CA: CPT

## 2020-01-21 PROCEDURE — T1013: CPT

## 2020-01-21 PROCEDURE — 82803 BLOOD GASES ANY COMBINATION: CPT

## 2020-01-21 PROCEDURE — 84295 ASSAY OF SERUM SODIUM: CPT

## 2020-01-21 PROCEDURE — 96374 THER/PROPH/DIAG INJ IV PUSH: CPT

## 2020-01-21 PROCEDURE — 82009 KETONE BODYS QUAL: CPT

## 2020-01-21 PROCEDURE — 82330 ASSAY OF CALCIUM: CPT

## 2020-01-21 PROCEDURE — 84702 CHORIONIC GONADOTROPIN TEST: CPT

## 2020-01-21 PROCEDURE — 99232 SBSQ HOSP IP/OBS MODERATE 35: CPT

## 2020-01-21 PROCEDURE — 83036 HEMOGLOBIN GLYCOSYLATED A1C: CPT

## 2020-01-21 PROCEDURE — 84132 ASSAY OF SERUM POTASSIUM: CPT

## 2020-01-21 PROCEDURE — 87633 RESP VIRUS 12-25 TARGETS: CPT

## 2020-01-21 PROCEDURE — 82962 GLUCOSE BLOOD TEST: CPT

## 2020-01-21 PROCEDURE — 83690 ASSAY OF LIPASE: CPT

## 2020-01-21 PROCEDURE — 83735 ASSAY OF MAGNESIUM: CPT

## 2020-01-21 PROCEDURE — 84100 ASSAY OF PHOSPHORUS: CPT

## 2020-01-21 PROCEDURE — 82435 ASSAY OF BLOOD CHLORIDE: CPT

## 2020-01-21 PROCEDURE — 87798 DETECT AGENT NOS DNA AMP: CPT

## 2020-01-21 PROCEDURE — 93005 ELECTROCARDIOGRAM TRACING: CPT

## 2020-01-21 PROCEDURE — 36415 COLL VENOUS BLD VENIPUNCTURE: CPT

## 2020-01-21 PROCEDURE — 85014 HEMATOCRIT: CPT

## 2020-01-21 PROCEDURE — 82550 ASSAY OF CK (CPK): CPT

## 2020-01-21 RX ORDER — DEXTROSE 50 % IN WATER 50 %
25 SYRINGE (ML) INTRAVENOUS ONCE
Refills: 0 | Status: DISCONTINUED | OUTPATIENT
Start: 2020-01-21 | End: 2020-01-21

## 2020-01-21 RX ORDER — INSULIN GLARGINE 100 [IU]/ML
25 INJECTION, SOLUTION SUBCUTANEOUS AT BEDTIME
Refills: 0 | Status: DISCONTINUED | OUTPATIENT
Start: 2020-01-21 | End: 2020-01-21

## 2020-01-21 RX ORDER — GLUCAGON INJECTION, SOLUTION 0.5 MG/.1ML
1 INJECTION, SOLUTION SUBCUTANEOUS ONCE
Refills: 0 | Status: DISCONTINUED | OUTPATIENT
Start: 2020-01-21 | End: 2020-01-21

## 2020-01-21 RX ORDER — SODIUM CHLORIDE 9 MG/ML
1000 INJECTION, SOLUTION INTRAVENOUS
Refills: 0 | Status: DISCONTINUED | OUTPATIENT
Start: 2020-01-21 | End: 2020-01-21

## 2020-01-21 RX ORDER — INSULIN LISPRO 100/ML
5 VIAL (ML) SUBCUTANEOUS
Refills: 0 | Status: DISCONTINUED | OUTPATIENT
Start: 2020-01-21 | End: 2020-01-21

## 2020-01-21 RX ORDER — DEXTROSE 50 % IN WATER 50 %
12.5 SYRINGE (ML) INTRAVENOUS ONCE
Refills: 0 | Status: DISCONTINUED | OUTPATIENT
Start: 2020-01-21 | End: 2020-01-21

## 2020-01-21 RX ORDER — DEXTROSE 50 % IN WATER 50 %
15 SYRINGE (ML) INTRAVENOUS ONCE
Refills: 0 | Status: DISCONTINUED | OUTPATIENT
Start: 2020-01-21 | End: 2020-01-21

## 2020-01-21 RX ORDER — INSULIN LISPRO 100/ML
VIAL (ML) SUBCUTANEOUS
Refills: 0 | Status: DISCONTINUED | OUTPATIENT
Start: 2020-01-21 | End: 2020-01-21

## 2020-01-21 RX ORDER — INSULIN LISPRO 100/ML
3 VIAL (ML) SUBCUTANEOUS
Qty: 0 | Refills: 0 | DISCHARGE

## 2020-01-21 RX ORDER — INSULIN LISPRO 100/ML
6 VIAL (ML) SUBCUTANEOUS
Qty: 0 | Refills: 0 | DISCHARGE

## 2020-01-21 RX ORDER — INSULIN LISPRO 100/ML
VIAL (ML) SUBCUTANEOUS AT BEDTIME
Refills: 0 | Status: DISCONTINUED | OUTPATIENT
Start: 2020-01-21 | End: 2020-01-21

## 2020-01-21 RX ORDER — INSULIN GLARGINE 100 [IU]/ML
22 INJECTION, SOLUTION SUBCUTANEOUS EVERY MORNING
Refills: 0 | Status: DISCONTINUED | OUTPATIENT
Start: 2020-01-21 | End: 2020-01-21

## 2020-01-21 RX ADMIN — ENOXAPARIN SODIUM 40 MILLIGRAM(S): 100 INJECTION SUBCUTANEOUS at 11:30

## 2020-01-21 RX ADMIN — Medication 3: at 08:50

## 2020-01-21 RX ADMIN — Medication 2 UNIT(S): at 08:50

## 2020-01-21 RX ADMIN — INSULIN GLARGINE 22 UNIT(S): 100 INJECTION, SOLUTION SUBCUTANEOUS at 11:29

## 2020-01-21 RX ADMIN — Medication 5 UNIT(S): at 15:14

## 2020-01-21 NOTE — PROGRESS NOTE ADULT - SUBJECTIVE AND OBJECTIVE BOX
CC: DKA     INTERVAL HPI/OVERNIGHT EVENTS: Patient seen and examined. No acute issues overnight. +Cough. Denies chest pain, SOB, dizziness, nausea, vomiting, fever, chills.     Vital Signs Last 24 Hrs  T(C): 36.7 (21 Jan 2020 08:08), Max: 37.1 (20 Jan 2020 21:17)  T(F): 98.1 (21 Jan 2020 08:08), Max: 98.8 (20 Jan 2020 21:17)  HR: 80 (21 Jan 2020 08:08) (80 - 89)  BP: 103/68 (21 Jan 2020 08:08) (100/63 - 122/66)  BP(mean): 84 (20 Jan 2020 20:00) (84 - 84)  RR: 18 (21 Jan 2020 08:08) (12 - 18)  SpO2: 97% (21 Jan 2020 08:08) (97% - 100%)    PHYSICAL EXAM:    GENERAL: NAD, well-groomed  HEENT: PERRL, +EOMI  NECK: soft, Supple, No JVD,   CHEST/LUNG: Coarse BS bilaterally; No wheezing  HEART: S1S2+, Regular rate and rhythm; No murmurs  ABDOMEN: Soft, Nontender, Nondistended; Bowel sounds present  EXTREMITIES:  No clubbing, cyanosis, or edema  SKIN: No rashes or lesions  NEURO: AAOX3    I&O's Detail    20 Jan 2020 07:01  -  21 Jan 2020 07:00  --------------------------------------------------------  IN:    dextrose 5% + sodium chloride 0.45%.: 400 mL    insulin regular Infusion: 3 mL  Total IN: 403 mL    OUT:  Total OUT: 0 mL    Total NET: 403 mL      21 Jan 2020 07:01  -  21 Jan 2020 13:56  --------------------------------------------------------  IN:    Oral Fluid: 720 mL  Total IN: 720 mL    OUT:  Total OUT: 0 mL    Total NET: 720 mL          CARDIAC MARKERS ( 20 Jan 2020 03:45 )  x     / x     / 45 U/L / x     / x                                11.6   3.25  )-----------( 176      ( 21 Jan 2020 08:22 )             38.0     21 Jan 2020 08:22    133    |  101    |  6.0    ----------------------------<  262    4.6     |  22.0   |  0.35     Ca    8.2        21 Jan 2020 08:22  Phos  2.2       20 Jan 2020 08:35  Mg     1.6       20 Jan 2020 08:35    TPro  8.5    /  Alb  5.3    /  TBili  0.9    /  DBili  x      /  AST  30     /  ALT  25     /  AlkPhos  135    20 Jan 2020 03:45      CAPILLARY BLOOD GLUCOSE      POCT Blood Glucose.: 249 mg/dL (21 Jan 2020 11:28)  POCT Blood Glucose.: 279 mg/dL (21 Jan 2020 08:40)  POCT Blood Glucose.: 195 mg/dL (20 Jan 2020 21:19)  POCT Blood Glucose.: 263 mg/dL (20 Jan 2020 16:50)    LIVER FUNCTIONS - ( 20 Jan 2020 03:45 )  Alb: 5.3 g/dL / Pro: 8.5 g/dL / ALK PHOS: 135 U/L / ALT: 25 U/L / AST: 30 U/L / GGT: x             Hemoglobin A1C, Whole Blood: 10.6 % (01-21-20 @ 08:22)    MEDICATIONS  (STANDING):  dextrose 5%. 1000 milliLiter(s) (50 mL/Hr) IV Continuous <Continuous>  dextrose 50% Injectable 12.5 Gram(s) IV Push once  dextrose 50% Injectable 25 Gram(s) IV Push once  dextrose 50% Injectable 25 Gram(s) IV Push once  enoxaparin Injectable 40 milliGRAM(s) SubCutaneous daily  insulin glargine Injectable (LANTUS) 25 Unit(s) SubCutaneous at bedtime  insulin lispro (HumaLOG) corrective regimen sliding scale   SubCutaneous three times a day before meals  insulin lispro (HumaLOG) corrective regimen sliding scale   SubCutaneous at bedtime  insulin lispro Injectable (HumaLOG) 5 Unit(s) SubCutaneous three times a day before meals    MEDICATIONS  (PRN):  dextrose 40% Gel 15 Gram(s) Oral once PRN Blood Glucose LESS THAN 70 milliGRAM(s)/deciliter  glucagon  Injectable 1 milliGRAM(s) IntraMuscular once PRN Glucose LESS THAN 70 milligrams/deciliter      RADIOLOGY & ADDITIONAL TESTS: CC: DKA   feels well. denies any URI symptoms, tolerating diet. Neck pain resolved.     INTERVAL HPI/OVERNIGHT EVENTS: Patient seen and examined. No acute issues overnight. +Cough. Denies chest pain, SOB, dizziness, nausea, vomiting, fever, chills.     Vital Signs Last 24 Hrs  T(C): 36.7 (21 Jan 2020 08:08), Max: 37.1 (20 Jan 2020 21:17)  T(F): 98.1 (21 Jan 2020 08:08), Max: 98.8 (20 Jan 2020 21:17)  HR: 80 (21 Jan 2020 08:08) (80 - 89)  BP: 103/68 (21 Jan 2020 08:08) (100/63 - 122/66)  BP(mean): 84 (20 Jan 2020 20:00) (84 - 84)  RR: 18 (21 Jan 2020 08:08) (12 - 18)  SpO2: 97% (21 Jan 2020 08:08) (97% - 100%)    PHYSICAL EXAM:    GENERAL: NAD, well-groomed  HEENT: PERRL, +EOMI  NECK: soft, Supple, No JVD,   CHEST/LUNG: Coarse BS bilaterally; No wheezing  HEART: S1S2+, Regular rate and rhythm; No murmurs  ABDOMEN: Soft, Nontender, Nondistended; Bowel sounds present  EXTREMITIES:  No clubbing, cyanosis, or edema  SKIN: No rashes or lesions  NEURO: AAOX3    I&O's Detail    20 Jan 2020 07:01  -  21 Jan 2020 07:00  --------------------------------------------------------  IN:    dextrose 5% + sodium chloride 0.45%.: 400 mL    insulin regular Infusion: 3 mL  Total IN: 403 mL    OUT:  Total OUT: 0 mL    Total NET: 403 mL      21 Jan 2020 07:01  -  21 Jan 2020 13:56  --------------------------------------------------------  IN:    Oral Fluid: 720 mL  Total IN: 720 mL    OUT:  Total OUT: 0 mL    Total NET: 720 mL          CARDIAC MARKERS ( 20 Jan 2020 03:45 )  x     / x     / 45 U/L / x     / x                                11.6   3.25  )-----------( 176      ( 21 Jan 2020 08:22 )             38.0     21 Jan 2020 08:22    133    |  101    |  6.0    ----------------------------<  262    4.6     |  22.0   |  0.35     Ca    8.2        21 Jan 2020 08:22  Phos  2.2       20 Jan 2020 08:35  Mg     1.6       20 Jan 2020 08:35    TPro  8.5    /  Alb  5.3    /  TBili  0.9    /  DBili  x      /  AST  30     /  ALT  25     /  AlkPhos  135    20 Jan 2020 03:45      CAPILLARY BLOOD GLUCOSE      POCT Blood Glucose.: 249 mg/dL (21 Jan 2020 11:28)  POCT Blood Glucose.: 279 mg/dL (21 Jan 2020 08:40)  POCT Blood Glucose.: 195 mg/dL (20 Jan 2020 21:19)  POCT Blood Glucose.: 263 mg/dL (20 Jan 2020 16:50)    LIVER FUNCTIONS - ( 20 Jan 2020 03:45 )  Alb: 5.3 g/dL / Pro: 8.5 g/dL / ALK PHOS: 135 U/L / ALT: 25 U/L / AST: 30 U/L / GGT: x             Hemoglobin A1C, Whole Blood: 10.6 % (01-21-20 @ 08:22)    MEDICATIONS  (STANDING):  dextrose 5%. 1000 milliLiter(s) (50 mL/Hr) IV Continuous <Continuous>  dextrose 50% Injectable 12.5 Gram(s) IV Push once  dextrose 50% Injectable 25 Gram(s) IV Push once  dextrose 50% Injectable 25 Gram(s) IV Push once  enoxaparin Injectable 40 milliGRAM(s) SubCutaneous daily  insulin glargine Injectable (LANTUS) 25 Unit(s) SubCutaneous at bedtime  insulin lispro (HumaLOG) corrective regimen sliding scale   SubCutaneous three times a day before meals  insulin lispro (HumaLOG) corrective regimen sliding scale   SubCutaneous at bedtime  insulin lispro Injectable (HumaLOG) 5 Unit(s) SubCutaneous three times a day before meals    MEDICATIONS  (PRN):  dextrose 40% Gel 15 Gram(s) Oral once PRN Blood Glucose LESS THAN 70 milliGRAM(s)/deciliter  glucagon  Injectable 1 milliGRAM(s) IntraMuscular once PRN Glucose LESS THAN 70 milligrams/deciliter      RADIOLOGY & ADDITIONAL TESTS:

## 2020-01-21 NOTE — ADVANCED PRACTICE NURSE CONSULT - ASSESSMENT
went to see pt in am, pt is a+ox3 c/o 0 pain. pt states she has diabetes type 1 for 9 years and fu w dr perkins. she takes humalog 50 units for breakfast and 50 for lunch and 40 units of basaglar in the evening via inuslin pen. she has a glucometer and she tests bg before meals and get 200 in am and many hypoglycemia events. whe she has hypoglycemia she drinks a full cup of  juicept was educated about hypoglycemia ss and treatment and was advised to recheck bg after treatment of hypoglycemia and once corrected to close to 100 to have a protein and a carb to hold the bg. pt verbalized understanding. pt has a fu appointment w dr landis on february 4 2020@ 1300

## 2020-01-21 NOTE — PROGRESS NOTE ADULT - ATTENDING COMMENTS
pt seen and examined at bedside.  Doing well. denies any issues. has been compliant with medications but her home insulin regimen is not correct.   her sugars are still not very well controlled but she will follow up with endocrine.  Await endocrine recs - needs long acting and meal coverage TID. DM education   possible DC today - discussed with pt who is in agreement.,

## 2020-01-21 NOTE — DISCHARGE NOTE PROVIDER - HOSPITAL COURSE
39yr old female with PMH of Type1 Diabetes presented to ED on 1/19 with nausea and vomiting. Reported daughter was sick at home. In the ER, she was found to be in DKA with BG of 501, HCO3 16, Anion Gap of 27, pH of 7.2,  RVP+ coronavirus. She was admitted to MICU and was treated with iv fluids, insulin drip. AG closed and her symptoms improved. MICU has deemed her stable for downgrade to medical floor.  The patient was seen in consultation by Endocrine. HGA1c 10.6.

## 2020-01-21 NOTE — DISCHARGE NOTE PROVIDER - NSDCMRMEDTOKEN_GEN_ALL_CORE_FT
Admelog SoloStar 100 units/mL injectable solution: 3 unit(s) injectable 3 times a day (before meals)    Avoid excess insulin. Monitor sugar levels closely.   Lantus Solostar Pen 100 units/mL subcutaneous solution: 11 unit(s) subcutaneous once a day (at bedtime)   pantoprazole 40 mg oral delayed release tablet: 1 tab(s) orally once a day (before a meal)  sucralfate 1 g oral tablet: 1 tab(s) orally 4 times a day Admelog SoloStar 100 units/mL injectable solution: 6 unit(s) injectable 3 times a day (before meals)  insulin glargine 100 units/mL subcutaneous solution: 30 unit(s) subcutaneous once a day (at bedtime)   pantoprazole 40 mg oral delayed release tablet: 1 tab(s) orally once a day (before a meal)  test strips (per patient&#x27;s insurance): 1 application subcutaneously 4 times a day. ** Compatible with patient&#x27;s glucometer **

## 2020-01-21 NOTE — ADVANCED PRACTICE NURSE CONSULT - RECOMMEDATIONS
continue diabetes self management education  please consider increasing lantus to 25 units qhs  pls consider increase humalog premeal insulin to 5 units  pls change humalog ss to moderate  cc-please task pt to diabetes wellness outpt

## 2020-01-21 NOTE — DISCHARGE NOTE PROVIDER - NSDCCPCAREPLAN_GEN_ALL_CORE_FT
PRINCIPAL DISCHARGE DIAGNOSIS  Diagnosis: DKA (diabetic ketoacidoses)  Assessment and Plan of Treatment: Resolved  Continue current Insulin dosing prescription  Check blood glucose before meals and at bedtime  maintain Blood glucose log  follow up with Endocrine 5-7 days for further management  follow up with PMD 3-5 days, sooner if needed      SECONDARY DISCHARGE DIAGNOSES  Diagnosis: Coronavirus infection  Assessment and Plan of Treatment: Continue tylenol as needed  Continue robitussion as needed    Diagnosis: Nausea & vomiting  Assessment and Plan of Treatment: resolved

## 2020-01-21 NOTE — DISCHARGE NOTE PROVIDER - CARE PROVIDER_API CALL
Renuka Weinstein ()  Family Medicine  [address] [place] [address]  Phone: [phone]  Fax: [phone]  Follow Up Time:     Terrence Arevalo)  EndocrinologyMetabDiabetes; Internal Medicine  [address] [place] [address]  Phone: [phone]  Fax: [phone]  Follow Up Time:

## 2020-01-21 NOTE — PROGRESS NOTE ADULT - ASSESSMENT
39 year old female with Type 1 DM uncontrolled admitted with DKA in setting of viral URI.  Her DKA has resolved after IVFs and insulin gtt.  She remains hyperglycemic.      1.  Type 1 DM-  Reviewed proper insulin injection techniques with patient with assistance of .  Would increase Lantus to 30 units qhs and Increase premeal humalog to 6 units.  Will need close outpatient follow up for further titration of regimen.  Her insulin requirements here are substantially less than she was on at home, which I suspect may be multifactorial (poor diet compliance at home +/_ poor injection technique).    2.  URI-  supportive care as per medicine.

## 2020-01-21 NOTE — PROGRESS NOTE ADULT - SUBJECTIVE AND OBJECTIVE BOX
CC:  follow up DM    Interval HPI/ overnight events:  Patient feels well.  Insulin doses increased today.    Vital Signs Last 24 Hrs  T(C): 36.7 (21 Jan 2020 08:08), Max: 37.1 (20 Jan 2020 21:17)  T(F): 98.1 (21 Jan 2020 08:08), Max: 98.8 (20 Jan 2020 21:17)  HR: 80 (21 Jan 2020 08:08) (80 - 89)  BP: 103/68 (21 Jan 2020 08:08) (100/63 - 122/66)  BP(mean): 84 (20 Jan 2020 20:00) (84 - 84)  RR: 18 (21 Jan 2020 08:08) (12 - 18)  SpO2: 97% (21 Jan 2020 08:08) (97% - 100%)    PE:  Gen: AAO, NAD  HEENT:  sclera anicteric, MMM  Neck:  no thyromegaly, no LAD  CV:  nl S1 + S2, RRR, no m/r/g  Resp:  nl respiratory effort, CTA b/l  GI/ Abd: soft, NT/ ND, BS +  Skin:  no acanthosis  Psych:  affect appropriate    LABS:  Hemoglobin A1C, Whole Blood: 10.6 % (01.21.20 @ 08:22)    01-21    133<L>  |  101  |  6.0<L>  ----------------------------<  262<H>  4.6   |  22.0  |  0.35<L>    Ca    8.2<L>      21 Jan 2020 08:22  Phos  2.2     01-20  Mg     1.9     01-21    TPro  8.5  /  Alb  5.3<H>  /  TBili  0.9  /  DBili  x   /  AST  30  /  ALT  25  /  AlkPhos  135<H>  01-20                          11.6   3.25  )-----------( 176      ( 21 Jan 2020 08:22 )             38.0     CAPILLARY BLOOD GLUCOSE  POCT Blood Glucose.: 287 mg/dL (21 Jan 2020 15:10)  POCT Blood Glucose.: 249 mg/dL (21 Jan 2020 11:28)  POCT Blood Glucose.: 279 mg/dL (21 Jan 2020 08:40)  POCT Blood Glucose.: 195 mg/dL (20 Jan 2020 21:19)  POCT Blood Glucose.: 263 mg/dL (20 Jan 2020 16:50)

## 2020-01-21 NOTE — DISCHARGE NOTE NURSING/CASE MANAGEMENT/SOCIAL WORK - PATIENT PORTAL LINK FT
You can access the FollowMyHealth Patient Portal offered by [facility] by registering at the following website: [email or web address]. By joining [facility]’s FollowMyHealth portal, you will also be able to view your health information using other applications (apps) compatible with our system.

## 2020-01-21 NOTE — PROGRESS NOTE ADULT - ASSESSMENT
39yr old female with PMH of Type1 Diabetes since last yrs presented to ED on 1/19 with nausea and vomiting, pain behind her neck after vomiting. Her daughter was sick last week and she was feeling since yesterday. In the ER, she was found to be in DKA with BG of 501, HCO3 16, Anion Gap of 27, pH of 7.2, She was admitted to MICU and was treated with iv fluids, insulin drip. her AG has closed and her symptoms have improved. MICU has deemed her stable for downgrade to medical floor.        # Type1 DM with DKA -   HGA1c 10.6  Discussed with Diabetes educator, increase Lantus to 25 units, increase pre meal to 5 units, change ISS to moderate coverage   Endocrine following    # hypokalemia -replaced, now normal, monitor K  # hypomagnesemia - Replaced , monitor magnesium  # URI 2/2 coronavirus  - currently denies any symptoms - Tylenol PRN, robitussin as needed   # DVT Px

## 2020-01-21 NOTE — DISCHARGE NOTE PROVIDER - CARE PROVIDERS DIRECT ADDRESSES
,DirectAddress_Unknown,[email or web address]

## 2020-01-22 VITALS
DIASTOLIC BLOOD PRESSURE: 75 MMHG | HEART RATE: 70 BPM | TEMPERATURE: 98 F | SYSTOLIC BLOOD PRESSURE: 133 MMHG | RESPIRATION RATE: 18 BRPM

## 2020-01-22 RX ORDER — INSULIN GLARGINE 100 [IU]/ML
30 INJECTION, SOLUTION SUBCUTANEOUS
Qty: 300 | Refills: 0
Start: 2020-01-22 | End: 2020-02-20

## 2020-02-01 ENCOUNTER — OUTPATIENT (OUTPATIENT)
Dept: OUTPATIENT SERVICES | Facility: HOSPITAL | Age: 40
LOS: 1 days | End: 2020-02-01
Payer: MEDICAID

## 2020-02-01 DIAGNOSIS — Z98.89 OTHER SPECIFIED POSTPROCEDURAL STATES: Chronic | ICD-10-CM

## 2020-02-04 ENCOUNTER — APPOINTMENT (OUTPATIENT)
Dept: ENDOCRINOLOGY | Facility: CLINIC | Age: 40
End: 2020-02-04

## 2020-02-27 DIAGNOSIS — Z71.89 OTHER SPECIFIED COUNSELING: ICD-10-CM

## 2020-03-01 PROCEDURE — G9001: CPT

## 2021-02-09 NOTE — PATIENT PROFILE ADULT - ..
Addended by: Kyree Lancaster on: 2/9/2021 01:24 PM     Modules accepted: Orders
16-May-2019 16:29:57

## 2021-05-22 NOTE — PATIENT PROFILE ADULT - AD AGENT PHONE NUMBER
[phone] I will SWITCH the dose or number of times a day I take the medications listed below when I get home from the hospital:  None

## 2022-09-08 NOTE — PATIENT PROFILE ADULT - NSPROPTRIGHTSUPPORTNAME_GEN_A_NUR
Pt arrives with c/o severe back pain that started last Sunday. Pt states she turned and injured back. Pt states she has horrible back issues. Pt denies any loss of bowel or bladder. Pt has been taking tylenol 3 once a day.   Ralph Solano

## 2023-04-19 NOTE — PATIENT PROFILE ADULT - NSPROMEDSADMININFO_GEN_A_NUR
no concerns Cyclophosphamide Counseling:  I discussed with the patient the risks of cyclophosphamide including but not limited to hair loss, hormonal abnormalities, decreased fertility, abdominal pain, diarrhea, nausea and vomiting, bone marrow suppression and infection. The patient understands that monitoring is required while taking this medication.
